# Patient Record
Sex: MALE | Race: WHITE | NOT HISPANIC OR LATINO
[De-identification: names, ages, dates, MRNs, and addresses within clinical notes are randomized per-mention and may not be internally consistent; named-entity substitution may affect disease eponyms.]

---

## 2017-05-11 ENCOUNTER — APPOINTMENT (OUTPATIENT)
Dept: VASCULAR SURGERY | Facility: CLINIC | Age: 52
End: 2017-05-11

## 2017-06-01 ENCOUNTER — APPOINTMENT (OUTPATIENT)
Dept: VASCULAR SURGERY | Facility: CLINIC | Age: 52
End: 2017-06-01

## 2017-06-01 VITALS
HEIGHT: 73 IN | HEART RATE: 70 BPM | DIASTOLIC BLOOD PRESSURE: 87 MMHG | SYSTOLIC BLOOD PRESSURE: 140 MMHG | TEMPERATURE: 98 F

## 2017-06-01 DIAGNOSIS — I83.892 VARICOSE VEINS OF LEFT LOWER EXTREMITY WITH OTHER COMPLICATIONS: ICD-10-CM

## 2018-01-18 ENCOUNTER — TRANSCRIPTION ENCOUNTER (OUTPATIENT)
Age: 53
End: 2018-01-18

## 2018-07-02 ENCOUNTER — NON-APPOINTMENT (OUTPATIENT)
Age: 53
End: 2018-07-02

## 2018-07-02 ENCOUNTER — APPOINTMENT (OUTPATIENT)
Dept: ELECTROPHYSIOLOGY | Facility: CLINIC | Age: 53
End: 2018-07-02
Payer: COMMERCIAL

## 2018-07-02 VITALS
HEIGHT: 73 IN | WEIGHT: 231.92 LBS | DIASTOLIC BLOOD PRESSURE: 76 MMHG | BODY MASS INDEX: 30.74 KG/M2 | HEART RATE: 84 BPM | SYSTOLIC BLOOD PRESSURE: 115 MMHG | OXYGEN SATURATION: 98 %

## 2018-07-02 PROCEDURE — 93000 ELECTROCARDIOGRAM COMPLETE: CPT

## 2018-07-02 PROCEDURE — 99214 OFFICE O/P EST MOD 30 MIN: CPT | Mod: 25

## 2018-07-05 ENCOUNTER — OUTPATIENT (OUTPATIENT)
Dept: OUTPATIENT SERVICES | Facility: HOSPITAL | Age: 53
LOS: 1 days | End: 2018-07-05
Payer: COMMERCIAL

## 2018-07-05 ENCOUNTER — TRANSCRIPTION ENCOUNTER (OUTPATIENT)
Age: 53
End: 2018-07-05

## 2018-07-05 VITALS
OXYGEN SATURATION: 99 % | RESPIRATION RATE: 16 BRPM | TEMPERATURE: 98 F | HEART RATE: 93 BPM | SYSTOLIC BLOOD PRESSURE: 124 MMHG | DIASTOLIC BLOOD PRESSURE: 78 MMHG

## 2018-07-05 VITALS — HEIGHT: 72 IN | WEIGHT: 231.04 LBS

## 2018-07-05 DIAGNOSIS — I48.0 PAROXYSMAL ATRIAL FIBRILLATION: ICD-10-CM

## 2018-07-05 LAB
ANION GAP SERPL CALC-SCNC: 9 MMOL/L — SIGNIFICANT CHANGE UP (ref 5–17)
BUN SERPL-MCNC: 17 MG/DL — SIGNIFICANT CHANGE UP (ref 8–20)
CALCIUM SERPL-MCNC: 9.1 MG/DL — SIGNIFICANT CHANGE UP (ref 8.6–10.2)
CHLORIDE SERPL-SCNC: 101 MMOL/L — SIGNIFICANT CHANGE UP (ref 98–107)
CO2 SERPL-SCNC: 30 MMOL/L — HIGH (ref 22–29)
CREAT SERPL-MCNC: 1.08 MG/DL — SIGNIFICANT CHANGE UP (ref 0.5–1.3)
GLUCOSE SERPL-MCNC: 112 MG/DL — SIGNIFICANT CHANGE UP (ref 70–115)
HCT VFR BLD CALC: 45.5 % — SIGNIFICANT CHANGE UP (ref 42–52)
HGB BLD-MCNC: 15.7 G/DL — SIGNIFICANT CHANGE UP (ref 14–18)
INR BLD: 1.88 RATIO — HIGH (ref 0.88–1.16)
MAGNESIUM SERPL-MCNC: 2 MG/DL — SIGNIFICANT CHANGE UP (ref 1.6–2.6)
MCHC RBC-ENTMCNC: 30.8 PG — SIGNIFICANT CHANGE UP (ref 27–31)
MCHC RBC-ENTMCNC: 34.5 G/DL — SIGNIFICANT CHANGE UP (ref 32–36)
MCV RBC AUTO: 89.4 FL — SIGNIFICANT CHANGE UP (ref 80–94)
PLATELET # BLD AUTO: 193 K/UL — SIGNIFICANT CHANGE UP (ref 150–400)
POTASSIUM SERPL-MCNC: 4.5 MMOL/L — SIGNIFICANT CHANGE UP (ref 3.5–5.3)
POTASSIUM SERPL-SCNC: 4.5 MMOL/L — SIGNIFICANT CHANGE UP (ref 3.5–5.3)
PROTHROM AB SERPL-ACNC: 20.9 SEC — HIGH (ref 9.8–12.7)
RBC # BLD: 5.09 M/UL — SIGNIFICANT CHANGE UP (ref 4.6–6.2)
RBC # FLD: 12.8 % — SIGNIFICANT CHANGE UP (ref 11–15.6)
SODIUM SERPL-SCNC: 140 MMOL/L — SIGNIFICANT CHANGE UP (ref 135–145)
WBC # BLD: 6.8 K/UL — SIGNIFICANT CHANGE UP (ref 4.8–10.8)
WBC # FLD AUTO: 6.8 K/UL — SIGNIFICANT CHANGE UP (ref 4.8–10.8)

## 2018-07-05 PROCEDURE — 93010 ELECTROCARDIOGRAM REPORT: CPT | Mod: 77

## 2018-07-05 PROCEDURE — 93320 DOPPLER ECHO COMPLETE: CPT

## 2018-07-05 PROCEDURE — 36415 COLL VENOUS BLD VENIPUNCTURE: CPT

## 2018-07-05 PROCEDURE — 93320 DOPPLER ECHO COMPLETE: CPT | Mod: 26

## 2018-07-05 PROCEDURE — 85610 PROTHROMBIN TIME: CPT

## 2018-07-05 PROCEDURE — 85027 COMPLETE CBC AUTOMATED: CPT

## 2018-07-05 PROCEDURE — 93325 DOPPLER ECHO COLOR FLOW MAPG: CPT | Mod: 26

## 2018-07-05 PROCEDURE — 93325 DOPPLER ECHO COLOR FLOW MAPG: CPT

## 2018-07-05 PROCEDURE — 76376 3D RENDER W/INTRP POSTPROCES: CPT | Mod: 26

## 2018-07-05 PROCEDURE — 93005 ELECTROCARDIOGRAM TRACING: CPT

## 2018-07-05 PROCEDURE — 93312 ECHO TRANSESOPHAGEAL: CPT | Mod: 26

## 2018-07-05 PROCEDURE — 92960 CARDIOVERSION ELECTRIC EXT: CPT

## 2018-07-05 PROCEDURE — 80048 BASIC METABOLIC PNL TOTAL CA: CPT

## 2018-07-05 PROCEDURE — 93312 ECHO TRANSESOPHAGEAL: CPT

## 2018-07-05 PROCEDURE — 83735 ASSAY OF MAGNESIUM: CPT

## 2018-07-05 RX ORDER — RIVAROXABAN 15 MG-20MG
0 KIT ORAL
Qty: 0 | Refills: 0 | COMMUNITY

## 2018-07-05 NOTE — DISCHARGE NOTE ADULT - PROVIDER TOKENS
TOKSUMMER:'[id number]' FREE:[LAST:[Anne],FIRST:[Boone],PHONE:[phone]],FAX:[(   )    -],ADDRESS:[[facility]  [address] [place], [place] [id number]]]

## 2018-07-05 NOTE — DISCHARGE NOTE ADULT - CARE PROVIDER_API CALL
Boone Rodríguez (MD), Cardiac Electrophysiology; Cardiology; Internal Medicine  [address] [place] [id number]  Phone: [phone]  Fax: [phone] Boone Rodríguez  [facility]  [address] [place] [address]  Phone: [phone]  Fax: (   )    -

## 2018-07-05 NOTE — DISCHARGE NOTE ADULT - HOSPITAL COURSE
53 year old male patient with a history of asthma and atrial fibrillation diagnosed in 2011 now with recurrent, symptomatic persistent AF on Xarelto since 7/3/18. He presented electively 7/5/18 and underwent 53 year old male patient with a history of asthma and atrial fibrillation diagnosed in 2011 now with recurrent, symptomatic persistent AF on Xarelto since 7/3/18. He presented electively 7/5/18 and underwent uncomplicated OWEN, which demonstrated no intracardiac thrombus, and successful cardioversion with restoration of sinus rhythm. He was observed per post procedure protocol and discharged home with a plan for outpatient follow up.

## 2018-07-05 NOTE — DISCHARGE NOTE ADULT - PLAN OF CARE
minimize arrhythmia burden -Take each dose of your anticoagulation medication (blood thinner) exactly as prescribed.   - Do not drive, operate heavy machinery or make important decisions for 24 hours following the procedure.  - You may resume all other activities the day after the procedure.  Call your doctor if:   - your rapid heart rhythm returns.  - you have any questions or concerns regarding the procedure.  If you experience increased difficulty breathing or chest pain, or if you faint or have dizzy spells, please seek immediate medical attention.

## 2018-07-05 NOTE — DISCHARGE NOTE ADULT - MEDICATION SUMMARY - MEDICATIONS TO TAKE
I will START or STAY ON the medications listed below when I get home from the hospital:    Xarelto  -- Indication: For stroke prevention    metoprolol succinate 50 mg oral tablet, extended release  -- 1 tab(s) by mouth once a day  -- Indication: For heart rate and rhythm management

## 2018-07-05 NOTE — DISCHARGE NOTE ADULT - CARE PLAN
Principal Discharge DX:	Persistent atrial fibrillation Principal Discharge DX:	Persistent atrial fibrillation  Goal:	minimize arrhythmia burden  Assessment and plan of treatment:	-Take each dose of your anticoagulation medication (blood thinner) exactly as prescribed.   - Do not drive, operate heavy machinery or make important decisions for 24 hours following the procedure.  - You may resume all other activities the day after the procedure.  Call your doctor if:   - your rapid heart rhythm returns.  - you have any questions or concerns regarding the procedure.  If you experience increased difficulty breathing or chest pain, or if you faint or have dizzy spells, please seek immediate medical attention.

## 2018-07-05 NOTE — DISCHARGE NOTE ADULT - PATIENT PORTAL LINK FT
You can access the [facility], offered by [facility], by registering with the following website: [email or web address]

## 2018-07-05 NOTE — PROGRESS NOTE ADULT - SUBJECTIVE AND OBJECTIVE BOX
Pt doing well s/p uncomplicated OWEN & DCCV. Denies complaint.     Exam:   VSS, NAD, A&O x 3  Skin: no erythema/edema/blistering at defib pad sites.   Card: S1/S2, RRR, no m/g/r  Resp: lungs CTA b/l  Abd: S/NT/ND  Ext: no edema, distal pulses intact    Assessment:   53 year old male patient with a history of asthma and atrial fibrillation diagnosed in 2011 now with recurrent, symptomatic persistent AF. Now status post OWEN negative for TIN thrombus and uncomplicated DCCV with restoration of sinus rhythm.     Plan:   Observation on telemetry per post op protocol.    Resume PO intake.   Ambulate w/ assist once fully awake & back to baseline mental status w/ VSS.  Continue Xarelto. Importance of strict compliance with anticoagulation regimen reinforced with pt.   Resume other home medications.   Anticipate d/c home once all criteria met, with outpt f/up in 1 month.

## 2018-07-05 NOTE — H&P PST ADULT - ASSESSMENT
53 year old male patient with a history of asthma and atrial fibrillation diagnosed in 2011 with recurrence of AFib. Started Xarelto two days ago. Plan for OWEN guided cardioversion    - Keep NPO post midnight day of the procedure.

## 2018-07-05 NOTE — CHART NOTE - NSCHARTNOTEFT_GEN_A_CORE
Procedure Note    OWEN/CV    Successful    Continue on Metoprolol and Xarelto   Followup in 2 weeks.

## 2018-07-25 ENCOUNTER — APPOINTMENT (OUTPATIENT)
Dept: ELECTROPHYSIOLOGY | Facility: CLINIC | Age: 53
End: 2018-07-25
Payer: COMMERCIAL

## 2018-07-25 ENCOUNTER — NON-APPOINTMENT (OUTPATIENT)
Age: 53
End: 2018-07-25

## 2018-07-25 VITALS
HEART RATE: 66 BPM | BODY MASS INDEX: 30.62 KG/M2 | SYSTOLIC BLOOD PRESSURE: 128 MMHG | DIASTOLIC BLOOD PRESSURE: 83 MMHG | OXYGEN SATURATION: 98 % | WEIGHT: 231 LBS | HEIGHT: 73 IN

## 2018-07-25 PROCEDURE — 93000 ELECTROCARDIOGRAM COMPLETE: CPT

## 2018-07-25 PROCEDURE — 99214 OFFICE O/P EST MOD 30 MIN: CPT

## 2018-07-25 RX ORDER — RIVAROXABAN 20 MG/1
20 TABLET, FILM COATED ORAL
Qty: 30 | Refills: 3 | Status: DISCONTINUED | COMMUNITY
Start: 2018-07-02 | End: 2018-07-25

## 2020-01-10 ENCOUNTER — EMERGENCY (EMERGENCY)
Facility: HOSPITAL | Age: 55
LOS: 1 days | Discharge: HOME | End: 2020-01-10
Attending: EMERGENCY MEDICINE
Payer: COMMERCIAL

## 2020-01-10 VITALS
SYSTOLIC BLOOD PRESSURE: 102 MMHG | RESPIRATION RATE: 15 BRPM | TEMPERATURE: 98 F | HEART RATE: 78 BPM | DIASTOLIC BLOOD PRESSURE: 70 MMHG | OXYGEN SATURATION: 98 %

## 2020-01-10 VITALS
DIASTOLIC BLOOD PRESSURE: 75 MMHG | TEMPERATURE: 98 F | RESPIRATION RATE: 17 BRPM | SYSTOLIC BLOOD PRESSURE: 123 MMHG | HEART RATE: 79 BPM | HEIGHT: 73 IN | WEIGHT: 235.01 LBS | OXYGEN SATURATION: 95 %

## 2020-01-10 LAB
ALBUMIN SERPL ELPH-MCNC: 4.4 G/DL — SIGNIFICANT CHANGE UP (ref 3.3–5)
ALP SERPL-CCNC: 64 U/L — SIGNIFICANT CHANGE UP (ref 40–120)
ALT FLD-CCNC: 37 U/L — SIGNIFICANT CHANGE UP (ref 10–45)
ANION GAP SERPL CALC-SCNC: 12 MMOL/L — SIGNIFICANT CHANGE UP (ref 5–17)
AST SERPL-CCNC: 21 U/L — SIGNIFICANT CHANGE UP (ref 10–40)
BASOPHILS # BLD AUTO: 0.08 K/UL — SIGNIFICANT CHANGE UP (ref 0–0.2)
BASOPHILS NFR BLD AUTO: 0.9 % — SIGNIFICANT CHANGE UP (ref 0–2)
BILIRUB SERPL-MCNC: 0.4 MG/DL — SIGNIFICANT CHANGE UP (ref 0.2–1.2)
BUN SERPL-MCNC: 20 MG/DL — SIGNIFICANT CHANGE UP (ref 7–23)
CALCIUM SERPL-MCNC: 9.4 MG/DL — SIGNIFICANT CHANGE UP (ref 8.4–10.5)
CHLORIDE SERPL-SCNC: 101 MMOL/L — SIGNIFICANT CHANGE UP (ref 96–108)
CO2 SERPL-SCNC: 25 MMOL/L — SIGNIFICANT CHANGE UP (ref 22–31)
CREAT SERPL-MCNC: 1.38 MG/DL — HIGH (ref 0.5–1.3)
EOSINOPHIL # BLD AUTO: 0.28 K/UL — SIGNIFICANT CHANGE UP (ref 0–0.5)
EOSINOPHIL NFR BLD AUTO: 3.1 % — SIGNIFICANT CHANGE UP (ref 0–6)
GLUCOSE SERPL-MCNC: 102 MG/DL — HIGH (ref 70–99)
HCT VFR BLD CALC: 49.9 % — SIGNIFICANT CHANGE UP (ref 39–50)
HGB BLD-MCNC: 17.4 G/DL — HIGH (ref 13–17)
IMM GRANULOCYTES NFR BLD AUTO: 0.3 % — SIGNIFICANT CHANGE UP (ref 0–1.5)
LYMPHOCYTES # BLD AUTO: 2.66 K/UL — SIGNIFICANT CHANGE UP (ref 1–3.3)
LYMPHOCYTES # BLD AUTO: 29.8 % — SIGNIFICANT CHANGE UP (ref 13–44)
MCHC RBC-ENTMCNC: 30.7 PG — SIGNIFICANT CHANGE UP (ref 27–34)
MCHC RBC-ENTMCNC: 34.9 GM/DL — SIGNIFICANT CHANGE UP (ref 32–36)
MCV RBC AUTO: 88.2 FL — SIGNIFICANT CHANGE UP (ref 80–100)
MONOCYTES # BLD AUTO: 1.02 K/UL — HIGH (ref 0–0.9)
MONOCYTES NFR BLD AUTO: 11.4 % — SIGNIFICANT CHANGE UP (ref 2–14)
NEUTROPHILS # BLD AUTO: 4.87 K/UL — SIGNIFICANT CHANGE UP (ref 1.8–7.4)
NEUTROPHILS NFR BLD AUTO: 54.5 % — SIGNIFICANT CHANGE UP (ref 43–77)
NRBC # BLD: 0 /100 WBCS — SIGNIFICANT CHANGE UP (ref 0–0)
PLATELET # BLD AUTO: 238 K/UL — SIGNIFICANT CHANGE UP (ref 150–400)
POTASSIUM SERPL-MCNC: 3.9 MMOL/L — SIGNIFICANT CHANGE UP (ref 3.5–5.3)
POTASSIUM SERPL-SCNC: 3.9 MMOL/L — SIGNIFICANT CHANGE UP (ref 3.5–5.3)
PROT SERPL-MCNC: 7.2 G/DL — SIGNIFICANT CHANGE UP (ref 6–8.3)
RBC # BLD: 5.66 M/UL — SIGNIFICANT CHANGE UP (ref 4.2–5.8)
RBC # FLD: 12.7 % — SIGNIFICANT CHANGE UP (ref 10.3–14.5)
SODIUM SERPL-SCNC: 138 MMOL/L — SIGNIFICANT CHANGE UP (ref 135–145)
WBC # BLD: 8.94 K/UL — SIGNIFICANT CHANGE UP (ref 3.8–10.5)
WBC # FLD AUTO: 8.94 K/UL — SIGNIFICANT CHANGE UP (ref 3.8–10.5)

## 2020-01-10 PROCEDURE — 93010 ELECTROCARDIOGRAM REPORT: CPT | Mod: 59

## 2020-01-10 PROCEDURE — 99152 MOD SED SAME PHYS/QHP 5/>YRS: CPT

## 2020-01-10 PROCEDURE — 99284 EMERGENCY DEPT VISIT MOD MDM: CPT | Mod: 25

## 2020-01-10 PROCEDURE — 92960 CARDIOVERSION ELECTRIC EXT: CPT

## 2020-01-10 RX ORDER — MIDAZOLAM HYDROCHLORIDE 1 MG/ML
4 INJECTION, SOLUTION INTRAMUSCULAR; INTRAVENOUS ONCE
Refills: 0 | Status: DISCONTINUED | OUTPATIENT
Start: 2020-01-10 | End: 2020-01-10

## 2020-01-10 RX ADMIN — MIDAZOLAM HYDROCHLORIDE 4 MILLIGRAM(S): 1 INJECTION, SOLUTION INTRAMUSCULAR; INTRAVENOUS at 22:07

## 2020-01-10 NOTE — ED PROVIDER NOTE - CLINICAL SUMMARY MEDICAL DECISION MAKING FREE TEXT BOX
54M h/o afib with cardioversions x 2 p/w sob, afib with RVR that started this AM. well appearing male, afib with rvr. onset < 48hrs, DEE VASC 0, no need for anticoagulation. will cardiovert 54M h/o afib with cardioversions x 2 p/w sob, afib with RVR that started this AM. well appearing male, afib with rvr. onset < 48hrs, DEE VASC 0, no need for anticoagulation. will cardiovert  Scotty: pt with onset of sx today. hx of paroxysmal afib. Has been shocked out of afib before. No chest pain, no syncope, no trauma. Patient requests cardioversion. Will cardiovert.

## 2020-01-10 NOTE — ED ADULT NURSE REASSESSMENT NOTE - NS ED NURSE REASSESS COMMENT FT1
A fib HR 100s on CM. Pt sedated with 4mg versed per MD Fajardo order for conscious sedation and cardioversion. 200J cardioversion at 2207, capnography, o2 sat, BP and cardiac monitoring. Ambu bag, crash cart at bedside. MD Allen and MD Fajardo at bedside. Conscious sedation paper work placed in chart. Pt consent obtained prior to procedure and placed in pt chart. Pt is back to baseline, alert, oriented & talking, sating 100% RA, NSR HR 80s and normotensive. Awaiting dispo at this time.

## 2020-01-10 NOTE — ED ADULT NURSE NOTE - OBJECTIVE STATEMENT
Pt here for [facility]. Arrives Ambulating independently     Modifications in dose or schedule: No    Patient reports she is feeling well denies any complaints.       Port previously accessed from lab draw, port flushed with saline, positive blood return 53 y/o male PMH episodes of A fib presents to ED reporting abnormal EKG at urgent care. Pt reports having EKG done after feeling like he was in A fib. Pt reports having this happen 3 times in the past and report heart was "restarted with electricity". Pt reports only change since today is pt had given up coffee for the new year but had a cup of coffee today. On exam, AOx3, speaking in complete sentences. Lung sounds CTA, NAD, O2 sat 98% RA. Abdomen soft, non-tender, non-distended. Pt denies CP, SOB, n/v/d, fever/chills. EKG completed and given to attending MD. CM in place A fib HR 100s. Heplock placed.

## 2020-01-10 NOTE — ED PROVIDER NOTE - ATTENDING CONTRIBUTION TO CARE
I performed a history and physical exam of the patient and discussed their management with the resident and /or advanced care provider. I reviewed the resident and /or ACP's note and agree with the documented findings and plan of care. My medical decision making and observations are found above.  Lungs clear, abd soft

## 2020-01-10 NOTE — ED PROVIDER NOTE - PHYSICAL EXAMINATION
Vitals: tachycardic, remainder wnl  Gen: laying comfortably in NAD  Head: NCAT  ENT: sclerae white, anicterus, moist mucous membranes. No exudates.   CV: tachycardic, irregular rhythm  Pulm: Clear to auscultation bilaterally. No wheezes, rales, or rhonchi  Abd: soft, normoactive BS x4, NTND, no rebound, no guarding, no rashes  Musculoskeletal:  No peripheral edema  Skin: no lesions or scars noted  Neurologic: AAOx3  : no CVA tenderness  Psych: normal affect

## 2020-01-10 NOTE — ED PROVIDER NOTE - PATIENT PORTAL LINK FT
You can access the FollowMyHealth Patient Portal offered by [facility] by registering at the following website: [email or web address]. By joining [employer]’s FollowMyHealth portal, you will also be able to view your health information using other applications (apps) compatible with our system.

## 2020-01-10 NOTE — ED PROVIDER NOTE - NS ED ROS FT
Gen: No fever, normal appetite  Eyes: No eye irritation or discharge  ENT: No earpain, congestion, sore throat  Resp: +sob  Cardiovascular: +palpitations  Gastroenteric: No nausea/vomiting, diarrhea, constipation  : No dysuria  MS: No joint or muscle pain  Skin: No rashes  Neuro: No headache  Remainder negative, except as per the HPI

## 2020-01-10 NOTE — ED PROCEDURE NOTE - ATTENDING CONTRIBUTION TO CARE
I Yonathan Fajardo MD discussed the procedure with the resident and or ACP and went over risks and benefits as well as indications for the procedure. I was present for key portions of the procedure itself and assisted as needed.

## 2020-01-10 NOTE — ED CLERICAL - NS ED CLERK NOTE PRE-ARRIVAL INFORMATION; ADDITIONAL PRE-ARRIVAL INFORMATION
CC/Reason For referral: Hx of AFib, Rapid AFib  Preferred Consultant(if applicable): N/A  Who admits for you (if needed): N/A  Do you have documents you would like to fax over? No  Would you still like to speak to an ED attending? Yes, call Dr Peres [phone] after patient is seen

## 2020-01-10 NOTE — ED PROVIDER NOTE - PROGRESS NOTE DETAILS
Princess Allen MD: cardioversion and conscious sedation performed. pt converted to sinus. pt now back to baxeline mental status after versed. dc home with pmd f/u

## 2020-01-10 NOTE — ED PROVIDER NOTE - NSFOLLOWUPINSTRUCTIONS_ED_ALL_ED_FT
1) Please follow-up with your cardiologist within the next 3 days.  Please call today or tomorrow for an appointment.  If you cannot follow-up with your doctor(s), please return to the ED for any urgent issues.  2) If you have any worsening of symptoms or any other concerns please return to the ED immediately.  3) Please continue taking your home medications as directed.  4) You may have been given a copy of your labs and/or imaging.  Please go over these with your primary care doctor.

## 2020-01-10 NOTE — ED PROVIDER NOTE - OBJECTIVE STATEMENT
54M h/o afib with cardioversions x 2 p/w sob, afib with RVR that started this AM. pt reports he had had afib twice prior in the last 10y, was cardioverted each time and sent hoem. Is not on any meds for afib or anticoagulation. does follow with a cardiologist for this. Was feeling well this morning when he was sitting at his desk, suddnely felt sob, dizzy, palpitations. requesting cardioversion at this time. denies f/c, URI symptoms, n/v, cp/sob, abd pain, diarrhea/constiaption.

## 2020-01-11 LAB
T4 FREE SERPL-MCNC: 1.4 NG/DL — SIGNIFICANT CHANGE UP (ref 0.9–1.8)
TSH SERPL-MCNC: 5.03 UIU/ML — HIGH (ref 0.27–4.2)

## 2020-01-12 ENCOUNTER — TRANSCRIPTION ENCOUNTER (OUTPATIENT)
Age: 55
End: 2020-01-12

## 2020-01-13 ENCOUNTER — NON-APPOINTMENT (OUTPATIENT)
Age: 55
End: 2020-01-13

## 2020-01-13 ENCOUNTER — APPOINTMENT (OUTPATIENT)
Dept: ELECTROPHYSIOLOGY | Facility: CLINIC | Age: 55
End: 2020-01-13
Payer: COMMERCIAL

## 2020-01-13 VITALS
DIASTOLIC BLOOD PRESSURE: 80 MMHG | HEART RATE: 61 BPM | HEIGHT: 73 IN | WEIGHT: 230 LBS | SYSTOLIC BLOOD PRESSURE: 110 MMHG | OXYGEN SATURATION: 98 % | BODY MASS INDEX: 30.48 KG/M2

## 2020-01-13 PROCEDURE — 99213 OFFICE O/P EST LOW 20 MIN: CPT

## 2020-01-13 PROCEDURE — 93000 ELECTROCARDIOGRAM COMPLETE: CPT

## 2020-01-29 NOTE — HISTORY OF PRESENT ILLNESS
[FreeTextEntry1] : Patient is a 54-year-old man with a prior history of paroxysmal atrial fibrillation.  He within the last week underwent repeat cardioversion for recurrent episodes of A. fib.  Prior to that he had electrical cardioversion approximately a year ago.  He has had no clear triggers to his A. fib.  He seems to be getting episodes approximately once yearly and question whether the frequency is increasing at this time.  The patient is aware of the A. fib when it occurs.  He feels symptoms of palpitations as well as fatigue.  He is currently feeling well and has no symptoms.  \par \par No h/o HTN, DM, Lung disease or thyroid disease\par Never tested for sleep apnea\par No GERD. Occasional alcohol\par Denies nicotine or excessive caffein

## 2020-01-29 NOTE — DISCUSSION/SUMMARY
[FreeTextEntry1] : He has a infrequent episodes of A. fib with no clear trigger.  He has Low CHADSVASc - no HTN or DM - \par Patient does not want antiarrhythmic.  We discussed the option of catheter ablation especially if these episodes were to become more frequent.  He will consider this option and follow-up with us.  In the meantime I have discussed discussed lifestyle modifications that may help to prevent his A. fib.  This includes exercise, weight loss and assessment for sleep apnea.  He will follow-up with his regular internist and obtain a cardiologist as well.  The patient will see me in follow-up if he has recurrent A. fib.  He is currently on aspirin 81 mg/day which would continue for now.\par

## 2020-01-29 NOTE — PHYSICAL EXAM
[General Appearance - Well Developed] : well developed [General Appearance - In No Acute Distress] : no acute distress [Normal Conjunctiva] : the conjunctiva exhibited no abnormalities [No Oral Pallor] : no oral pallor [Normal Jugular Venous V Waves Present] : normal jugular venous V waves present [Respiration, Rhythm And Depth] : normal respiratory rhythm and effort [Auscultation Breath Sounds / Voice Sounds] : lungs were clear to auscultation bilaterally [Abdomen Soft] : soft [Abdomen Tenderness] : non-tender [Abnormal Walk] : normal gait [Nail Clubbing] : no clubbing of the fingernails [Impaired Insight] : insight and judgment were intact [Cyanosis, Localized] : no localized cyanosis [5th Left ICS - MCL] : palpated at the 5th LICS in the midclavicular line [Normal Rate] : normal [Rhythm Regular] : regular [Normal S1] : normal S1 [Normal S2] : normal S2 [2+] : left 2+ [No Pitting Edema] : no pitting edema present

## 2020-02-03 ENCOUNTER — APPOINTMENT (OUTPATIENT)
Dept: INTERNAL MEDICINE | Facility: CLINIC | Age: 55
End: 2020-02-03
Payer: COMMERCIAL

## 2020-02-03 VITALS
RESPIRATION RATE: 16 BRPM | OXYGEN SATURATION: 98 % | SYSTOLIC BLOOD PRESSURE: 120 MMHG | TEMPERATURE: 98.2 F | HEART RATE: 73 BPM | WEIGHT: 231 LBS | HEIGHT: 73 IN | BODY MASS INDEX: 30.62 KG/M2 | DIASTOLIC BLOOD PRESSURE: 78 MMHG

## 2020-02-03 PROCEDURE — 99204 OFFICE O/P NEW MOD 45 MIN: CPT | Mod: 25

## 2020-02-03 PROCEDURE — ZZZZZ: CPT

## 2020-02-03 PROCEDURE — 94729 DIFFUSING CAPACITY: CPT

## 2020-02-03 PROCEDURE — 94010 BREATHING CAPACITY TEST: CPT

## 2020-02-03 PROCEDURE — 94727 GAS DIL/WSHOT DETER LNG VOL: CPT

## 2020-02-03 NOTE — PROCEDURE
[FreeTextEntry1] : Full pulmonary function tests and is within normal limits with an FEV1 of 3.73 or 89% predicted.  diffusing capacity is normal.

## 2020-02-03 NOTE — ASSESSMENT
[FreeTextEntry1] : #1 evaluate for obstructive sleep apnea. The patient will have an overnight home sleep study.  He will follow up on the phone after results are in.  For now, INGA will continue strict weight reduction efforts, sleep on the sides, and avoid alcohol and sedating medicines. The patient knows fully not to drive or work with machinery if having any sleepiness or tiredness. \par \par #2 h.o. PAF. Continue following closely with cardiologist Dr. Crane. \par \par #3 hypothyroidism.

## 2020-02-03 NOTE — REVIEW OF SYSTEMS
[Cough] : no cough [Sputum] : no sputum [Hemoptysis] : no hemoptysis [Wheezing] : no wheezing [Dyspnea] : no dyspnea [Chest Discomfort] : no chest discomfort [Syncope] : no syncope [Dizziness] : no dizziness [Negative] : Endocrine

## 2020-02-03 NOTE — PHYSICAL EXAM
[No Acute Distress] : no acute distress [Well Groomed] : well groomed [Normal Oropharynx] : normal oropharynx [Normal Appearance] : normal appearance [No Neck Mass] : no neck mass [Normal Rate/Rhythm] : normal rate/rhythm [Normal S1, S2] : normal s1, s2 [No Murmurs] : no murmurs [Clear to Auscultation Bilaterally] : clear to auscultation bilaterally [No Resp Distress] : no resp distress [Benign] : benign [No Abnormalities] : no abnormalities [Normal Gait] : normal gait [No Cyanosis] : no cyanosis [No Clubbing] : no clubbing [No Edema] : no edema [No Focal Deficits] : no focal deficits [Normal Color/ Pigmentation] : normal color/ pigmentation [Normal Affect] : normal affect

## 2020-02-03 NOTE — HISTORY OF PRESENT ILLNESS
[TextBox_4] : This initial pulmonary appointment for this pleasant 54-year-old male with a history of paroxysmal atrial fibrillation, hypothyroidism. He was referred by his cardiologist Dr. GUY Crane in order to have an overnight sleep study. Patient does have a history of significant snoring and occasional daytime somnolence. He does not take naps. There have not been witnessed apneas. He does not awaken with morning headache. He generally does awaken feeling not refreshed.\par \par He has a history of asthma, but recently has not been active. He denies recent cough, wheezing, dyspnea on exertion, or sputum production.\par \par He does not use home O2 and CPAP.\par \par He is a nonsmoker. He has an occasional alcohol drink.

## 2020-02-05 PROBLEM — Z00.00 ENCOUNTER FOR PREVENTIVE HEALTH EXAMINATION: Status: ACTIVE | Noted: 2020-02-05

## 2020-02-24 ENCOUNTER — APPOINTMENT (OUTPATIENT)
Dept: SLEEP CENTER | Facility: CLINIC | Age: 55
End: 2020-02-24

## 2023-05-26 ENCOUNTER — NON-APPOINTMENT (OUTPATIENT)
Age: 58
End: 2023-05-26

## 2023-05-26 RX ORDER — ASPIRIN 81 MG
81 TABLET, DELAYED RELEASE (ENTERIC COATED) ORAL
Refills: 0 | Status: DISCONTINUED | COMMUNITY
End: 2023-05-26

## 2023-05-28 ENCOUNTER — EMERGENCY (EMERGENCY)
Facility: HOSPITAL | Age: 58
LOS: 1 days | Discharge: HOME | End: 2023-05-28
Attending: STUDENT IN AN ORGANIZED HEALTH CARE EDUCATION/TRAINING PROGRAM
Payer: COMMERCIAL

## 2023-05-28 VITALS
DIASTOLIC BLOOD PRESSURE: 80 MMHG | WEIGHT: 214.07 LBS | RESPIRATION RATE: 17 BRPM | OXYGEN SATURATION: 98 % | TEMPERATURE: 98 F | HEART RATE: 92 BPM | HEIGHT: 73 IN | SYSTOLIC BLOOD PRESSURE: 169 MMHG

## 2023-05-28 VITALS
SYSTOLIC BLOOD PRESSURE: 118 MMHG | DIASTOLIC BLOOD PRESSURE: 81 MMHG | RESPIRATION RATE: 18 BRPM | TEMPERATURE: 97 F | HEART RATE: 87 BPM | OXYGEN SATURATION: 98 %

## 2023-05-28 LAB
ALBUMIN SERPL ELPH-MCNC: 3.9 G/DL — SIGNIFICANT CHANGE UP (ref 3.3–5)
ALP SERPL-CCNC: 56 U/L — SIGNIFICANT CHANGE UP (ref 40–120)
ALT FLD-CCNC: 31 U/L — SIGNIFICANT CHANGE UP (ref 10–45)
ANION GAP SERPL CALC-SCNC: 14 MMOL/L — SIGNIFICANT CHANGE UP (ref 5–17)
APTT BLD: 37.1 SEC — HIGH (ref 27.5–35.5)
AST SERPL-CCNC: 21 U/L — SIGNIFICANT CHANGE UP (ref 10–40)
BASOPHILS # BLD AUTO: 0.04 K/UL — SIGNIFICANT CHANGE UP (ref 0–0.2)
BASOPHILS NFR BLD AUTO: 0.6 % — SIGNIFICANT CHANGE UP (ref 0–2)
BILIRUB SERPL-MCNC: 0.5 MG/DL — SIGNIFICANT CHANGE UP (ref 0.2–1.2)
BUN SERPL-MCNC: 22 MG/DL — SIGNIFICANT CHANGE UP (ref 7–23)
CALCIUM SERPL-MCNC: 8.8 MG/DL — SIGNIFICANT CHANGE UP (ref 8.4–10.5)
CHLORIDE SERPL-SCNC: 104 MMOL/L — SIGNIFICANT CHANGE UP (ref 96–108)
CO2 SERPL-SCNC: 26 MMOL/L — SIGNIFICANT CHANGE UP (ref 22–31)
CREAT SERPL-MCNC: 1.14 MG/DL — SIGNIFICANT CHANGE UP (ref 0.5–1.3)
EGFR: 74 ML/MIN/1.73M2 — SIGNIFICANT CHANGE UP
EOSINOPHIL # BLD AUTO: 0.16 K/UL — SIGNIFICANT CHANGE UP (ref 0–0.5)
EOSINOPHIL NFR BLD AUTO: 2.5 % — SIGNIFICANT CHANGE UP (ref 0–6)
GLUCOSE SERPL-MCNC: 95 MG/DL — SIGNIFICANT CHANGE UP (ref 70–99)
HCT VFR BLD CALC: 48.4 % — SIGNIFICANT CHANGE UP (ref 39–50)
HGB BLD-MCNC: 16.3 G/DL — SIGNIFICANT CHANGE UP (ref 13–17)
IMM GRANULOCYTES NFR BLD AUTO: 0.3 % — SIGNIFICANT CHANGE UP (ref 0–0.9)
INR BLD: 1.87 RATIO — HIGH (ref 0.88–1.16)
LYMPHOCYTES # BLD AUTO: 1.25 K/UL — SIGNIFICANT CHANGE UP (ref 1–3.3)
LYMPHOCYTES # BLD AUTO: 19.3 % — SIGNIFICANT CHANGE UP (ref 13–44)
MAGNESIUM SERPL-MCNC: 1.9 MG/DL — SIGNIFICANT CHANGE UP (ref 1.6–2.6)
MCHC RBC-ENTMCNC: 30.2 PG — SIGNIFICANT CHANGE UP (ref 27–34)
MCHC RBC-ENTMCNC: 33.7 GM/DL — SIGNIFICANT CHANGE UP (ref 32–36)
MCV RBC AUTO: 89.6 FL — SIGNIFICANT CHANGE UP (ref 80–100)
MONOCYTES # BLD AUTO: 0.62 K/UL — SIGNIFICANT CHANGE UP (ref 0–0.9)
MONOCYTES NFR BLD AUTO: 9.6 % — SIGNIFICANT CHANGE UP (ref 2–14)
NEUTROPHILS # BLD AUTO: 4.38 K/UL — SIGNIFICANT CHANGE UP (ref 1.8–7.4)
NEUTROPHILS NFR BLD AUTO: 67.7 % — SIGNIFICANT CHANGE UP (ref 43–77)
NRBC # BLD: 0 /100 WBCS — SIGNIFICANT CHANGE UP (ref 0–0)
PLATELET # BLD AUTO: 205 K/UL — SIGNIFICANT CHANGE UP (ref 150–400)
POTASSIUM SERPL-MCNC: 4.1 MMOL/L — SIGNIFICANT CHANGE UP (ref 3.5–5.3)
POTASSIUM SERPL-SCNC: 4.1 MMOL/L — SIGNIFICANT CHANGE UP (ref 3.5–5.3)
PROT SERPL-MCNC: 6.2 G/DL — SIGNIFICANT CHANGE UP (ref 6–8.3)
PROTHROM AB SERPL-ACNC: 21.6 SEC — HIGH (ref 10.5–13.4)
RBC # BLD: 5.4 M/UL — SIGNIFICANT CHANGE UP (ref 4.2–5.8)
RBC # FLD: 12.1 % — SIGNIFICANT CHANGE UP (ref 10.3–14.5)
SODIUM SERPL-SCNC: 144 MMOL/L — SIGNIFICANT CHANGE UP (ref 135–145)
TROPONIN T, HIGH SENSITIVITY RESULT: 10 NG/L — SIGNIFICANT CHANGE UP (ref 0–51)
WBC # BLD: 6.47 K/UL — SIGNIFICANT CHANGE UP (ref 3.8–10.5)
WBC # FLD AUTO: 6.47 K/UL — SIGNIFICANT CHANGE UP (ref 3.8–10.5)

## 2023-05-28 PROCEDURE — 86850 RBC ANTIBODY SCREEN: CPT

## 2023-05-28 PROCEDURE — 99204 OFFICE O/P NEW MOD 45 MIN: CPT

## 2023-05-28 PROCEDURE — 85610 PROTHROMBIN TIME: CPT

## 2023-05-28 PROCEDURE — 80053 COMPREHEN METABOLIC PANEL: CPT

## 2023-05-28 PROCEDURE — 86901 BLOOD TYPING SEROLOGIC RH(D): CPT

## 2023-05-28 PROCEDURE — 86900 BLOOD TYPING SEROLOGIC ABO: CPT

## 2023-05-28 PROCEDURE — 85730 THROMBOPLASTIN TIME PARTIAL: CPT

## 2023-05-28 PROCEDURE — 93005 ELECTROCARDIOGRAM TRACING: CPT

## 2023-05-28 PROCEDURE — 83735 ASSAY OF MAGNESIUM: CPT

## 2023-05-28 PROCEDURE — 99284 EMERGENCY DEPT VISIT MOD MDM: CPT | Mod: 25

## 2023-05-28 PROCEDURE — 84484 ASSAY OF TROPONIN QUANT: CPT

## 2023-05-28 PROCEDURE — 85025 COMPLETE CBC W/AUTO DIFF WBC: CPT

## 2023-05-28 PROCEDURE — 99285 EMERGENCY DEPT VISIT HI MDM: CPT

## 2023-05-28 RX ORDER — METOPROLOL TARTRATE 50 MG
25 TABLET ORAL ONCE
Refills: 0 | Status: COMPLETED | OUTPATIENT
Start: 2023-05-28 | End: 2023-05-28

## 2023-05-28 RX ORDER — ROSUVASTATIN CALCIUM 5 MG/1
1 TABLET ORAL
Qty: 14 | Refills: 0
Start: 2023-05-28 | End: 2023-06-10

## 2023-05-28 RX ORDER — METOPROLOL TARTRATE 50 MG
1 TABLET ORAL
Qty: 14 | Refills: 0
Start: 2023-05-28 | End: 2023-06-10

## 2023-05-28 RX ADMIN — Medication 25 MILLIGRAM(S): at 11:38

## 2023-05-28 NOTE — ED ADULT NURSE NOTE - OBJECTIVE STATEMENT
58Y male, A&)x4, pmh of afib. pt presents to the ED c/o afib. states x1 week ago on sunday he ate cookies without knowing there was thc in them, woke up Monday afternoon with palpitations. pt went to his cardiologist and was told he is in afib. pt has had episodes of afib in the past requiring electric cardioversion. endorses intermittent palpitations, light headiness, and CASTRO. denies f/n/v/d, headache, vision changes, back pain, abd pain, numbness/tingling, recent trauma.

## 2023-05-28 NOTE — ED PROVIDER NOTE - PHYSICAL EXAMINATION
GENERAL: Not in acute distress, non-toxic appearing  HEAD: normocephalic, atraumatic  HEENT: EOMI, normal conjunctiva, oral mucosa moist, neck supple  CARDIAC: irregular rhythm with rate in the 90s  PULM: clear to ascultation bilaterally, no crackles, rales, rhonchi, or wheezing  GI: abdomen nondistended, soft, nontender, no guarding or rebound tenderness  NEURO: alert and oriented x 3, normal speech, no focal motor or sensory deficits, gait normal, no gross neurologic deficit  MSK: No visible deformities, no peripheral edema, calf tenderness/redness/swelling  SKIN: No visible rashes, dry, well-perfused  PSYCH: appropriate mood and affect

## 2023-05-28 NOTE — ED PROVIDER NOTE - PATIENT PORTAL LINK FT
You can access the FollowMyHealth Patient Portal offered by [facility] by registering at the following website: [email or web address]. By joining [employer]’s FollowMyHealth portal, you will also be able to view your health information using other applications (apps) compatible with our system.

## 2023-05-28 NOTE — ED ADULT NURSE NOTE - NS PRO AD NO ADVANCE DIRECTIVE
Called patient to follow-up on missed group session and inform of practice assignment for final session. Patient was not reached. Inbasket message will be sent and I will make another follow-up call.  
No

## 2023-05-28 NOTE — ED PROVIDER NOTE - OBJECTIVE STATEMENT
58 year old male with hx of episodes of AFib in the past sent into the ED by cardiologist Dr. Boone Rodríguez for cardioversion and assessment for ablation. He reports 1 week ago he accidentally ate cookies that had ~200mg worth of THC. He went to sleep and woke up about 18hrs later and felt he was in Afib. He has had palpitations as well as exertional SOB and dizziness over the past week. He was seen by his cardiologist on 5/26, was started on Xarelto and told to come into the ED. He has had to come into the ED for electrical cardioversion in the past because chemical cardioversion was not effective. Prior to 5/26 the only medication he takes is 81mg of aspirin. He denies any chest pain, diaphoresis, n/v, recent fevers or chills. Over the past week his HR has gone up to the 130s. Currently in the ED, he feels comfortable and his HR is in the 90s.

## 2023-05-28 NOTE — CONSULT NOTE ADULT - ASSESSMENT
57yo man with history of asthma, Afib s/p DCCV in 2018 now off anticoagulation who presents with recurrent Afib with periods of symptomatic RVR at home. Resumed on anticoagulation with rivaroxaban. Currently relatively rate controlled in the ED.      #Afib with RVR  - Start  59yo man with history of asthma, Afib s/p DCCV in 2018 now off anticoagulation who presents with recurrent Afib with periods of symptomatic RVR at home. Resumed on anticoagulation with rivaroxaban. Currently relatively rate controlled in the ED.      #Afib with RVR  - Start metoprolol succinate 25 mg QD  - Continue rivaroxaban 20 mg daily  - Patient should be advised to avoid sports (eg, golf) at this time   - Can be discharged with outpatient cardiology/EP follow up. Outpatient OWEN/DCCV can be arranged.     Discussed with EP attending on call    Kody Harris MD  Department of Cardiology  Cardiology Fellow, PGY5 57yo man with history of asthma, Afib s/p DCCV in 2018 now off anticoagulation who presents with recurrent Afib with periods of symptomatic RVR at home. Resumed on anticoagulation with rivaroxaban. Currently relatively rate controlled in the ED.      #Afib with RVR  - Start metoprolol succinate 25 mg QD  - Continue rivaroxaban 20 mg daily  - Patient should be advised to avoid sports (eg, golf) at this time and call cardiologist or represent to ED if further palpitations/lightheadedness.   - Can be discharged with outpatient cardiology/EP follow up. Outpatient OWEN/DCCV can be arranged.     Discussed with EP attending on call    Kody Harris MD  Department of Cardiology  Cardiology Fellow, PGY5

## 2023-05-28 NOTE — CONSULT NOTE ADULT - ATTENDING COMMENTS
57yo man with history of asthma, Afib s/p DCCV in 2018 now off anticoagulation who presents with recurrent Afib with periods of symptomatic RVR at home. Resumed on anticoagulation with rivaroxaban. Currently relatively rate controlled in the ED.      #Afib with RVR  - Metoprolol succinate 25 mg QD  - Continue rivaroxaban 20 mg daily  - Patient does not want to stay until Tuesday for OWEN/DCCV.   - outpatient follow up.

## 2023-05-28 NOTE — ED PROVIDER NOTE - PROGRESS NOTE DETAILS
Updated Pt about labs and plan to DC home on Metoprolol and statin and follow up with Cardiology for OWEN and EP follow up. discussed importance of avoiding sporting activities including golf. Pt understands return precautions.

## 2023-05-28 NOTE — ED PROVIDER NOTE - ATTENDING CONTRIBUTION TO CARE
I, Winter Roberto, performed a history and physical exam of the patient and discussed their management with the resident and /or advanced care provider. I reviewed the resident and /or ACP's note and agree with the documented findings and plan of care. I was present and available for all procedures.     58-year-old male with history of proximal A-fib in the past presenting to the ED with plans for cardioversion and assessment for ablation.  Patient reports approximately 1 week ago he accidentally ate cookies that had approximately 200 mg with her THC in them.  States he went to sleep and woke up approximately 18 hours later with palpitations and was found to be in A-fib.  Notes that he has some exertional shortness of breath and lightheadedness upon exertion and changing positions.  Was seen by his cardiologist 5/26 started on Xarelto and was referred to the ED today for admission for plan for cardioversion and an assessment for possible ablation.  In the past she had chemical cardioversion that was not effective.  Prior to 5/26 medications patient was aspirin.  Patient has any chest pain diaphoresis nausea vomiting fevers chills.  Was in his normal state of health.  States over the past week his heart rate has gone up to as high as 130s.  Currently patient in the 80s to 90s atrial fibrillation resting comfortably with no acute complaints.    Well-appearing in no acute distress resting comfortably on stretcher.  irregularly irregular rhythm, lungs clear to auscultation bilaterally speaking in full sentences without increased work of breathing.  Saturating well on room air.  Abdomen soft nontender no rebound or guarding.  No CVA tenderness.  No lower extremity edema.  Patient currently rate controlled in the 80s to 90s with no medications.  Will obtain preop labs discussed with EP with plan for admission for cardioversion and possible ablation.

## 2023-05-28 NOTE — ED PROVIDER NOTE - NSFOLLOWUPINSTRUCTIONS_ED_ALL_ED_FT
Please take metoprolol succinate 25 mg once a day, Continue your rivaroxaban 20 mg once a day.   DO NOT partake in any sports such as Golf until you can follow up with your Cardiologist.     Palpitations    A palpitation is the feeling that your heartbeat is irregular or is faster than normal. It may feel like your heart is fluttering or skipping a beat. They may be caused by many things, including smoking, caffeine, alcohol, stress, and certain medicines. Although most causes of palpitations are not serious, palpitations can be a sign of a serious medical problem. Avoid caffeine, alcohol, and tobacco products at home. Try to reduce stress and anxiety and make sure to get plenty of rest.     SEEK IMMEDIATE MEDICAL CARE IF YOU HAVE ANY OF THE FOLLOWING SYMPTOMS: chest pain, shortness of breath, severe headache, dizziness/lightheadedness, or fainting.

## 2023-05-28 NOTE — ED ADULT NURSE NOTE - NSFALLHARMRISKINTERV_ED_ALL_ED

## 2023-05-28 NOTE — ED PROVIDER NOTE - CLINICAL SUMMARY MEDICAL DECISION MAKING FREE TEXT BOX
58 year old male with hx of episodes of AFib in the past sent into the ED by cardiologist Dr. Boone Rodríguez for cardioversion and assessment for ablation. He reports 1 week ago he accidentally ate cookies that had ~200mg worth of THC. He went to sleep and woke up about 18hrs later and felt he was in Afib. He has had palpitations as well as exertional SOB and dizziness over the past week. Pt's ECG is consistent with AFib but is currently rate controlled and Pt's vitals are stable, he is well appearing. No need for chemical or electrical cardioversion needed at this time. Will order preop labs and consult EP cardio for Pt to be eval for inpt vs outpt ablation.

## 2023-05-28 NOTE — CONSULT NOTE ADULT - SUBJECTIVE AND OBJECTIVE BOX
Cardiac Electrophysiology Consult Note    Patient seen and evaluated at bedside    Chief Complaint: Lethargy    Cardiologist: Boone Rodríguez    HPI:  59yo man with history of asthma, Afib s/p DCCV in 2018 now off anticoagulation who recently had recurrence of Afib with episodes of RVR at home prompting patient to present to ED. Per patient his cardiologist recommended he present to the ED for evaluation for cardioversion and/or ablation. Patient reports Sunday he ate THC infused cookies (by accident) and was "out" for about 18 hours and woke up Monday in Afib. He had episodes of HR as high as 140 per his smart watch with palpitations and weakness. He sw his cardiologist 526 who started him on rivaroxaban and told him to come to the ED. Patient reports he came in to be cardioverted, thought it would be one and done and he can leave same day. Prior to starting rivaroxaban 5/26, patient was taking asa 81 mg daily. ECG with Afib and ventricular rate 91 bpm, tele thus far with rate controlled Afib.     PMHx:   Asthma  Afib    PSHx:     Allergies:  No Known Allergies    Home Meds:  Rivaroxaban 20 mg daily    Current Medications:     FAMILY HISTORY:  Noncontributory    Social History:  Denies smoking  Will occasionally take THC gummies for sleep  Works as an     REVIEW OF SYSTEMS:  Constitutional:     [x ] negative [ ] fevers [ ] chills [ ] weight loss [ ] weight gain  HEENT:                  [x ] negative [ ] dry eyes [ ] eye irritation [ ] postnasal drip [ ] nasal congestion  CV:                         [ ] negative  [ ] chest pain [ ] orthopnea [x] palpitations [ ] murmur  Resp:                     [x ] negative [ ] cough [ ] shortness of breath [ ] dyspnea [ ] wheezing [ ] sputum [ ]hemoptysis  GI:                          [ x] negative [ ] nausea [ ] vomiting [ ] diarrhea [ ] constipation [ ] abd pain [ ] dysphagia   :                        [ x] negative [ ] dysuria [ ] nocturia [ ] hematuria [ ] increased urinary frequency  Musculoskeletal: [x ] negative [ ] back pain [ ] myalgias [ ] arthralgias [ ] fracture  Skin:                       [ x] negative [ ] rash [ ] itch  Neurological:        [ x] negative [ ] headache [ ] dizziness [ ] syncope [ ] weakness [ ] numbness  Psychiatric:           [ x] negative [ ] anxiety [ ] depression  Endocrine:            [ x] negative [ ] diabetes [ ] thyroid problem  Heme/Lymph:      [ x] negative [ ] anemia [ ] bleeding problem  Allergic/Immune: [ x] negative [ ] itchy eyes [ ] nasal discharge [ ] hives [ ] angioedema      Physical Exam:  T(F): 97.9 (05-28), Max: 97.9 (05-28)  HR: 98 (05-28) (92 - 98)  BP: 126/90 (05-28) (126/90 - 169/80)  RR: 17 (05-28)  SpO2: 98% (05-28)  GENERAL: No acute distress, well-developed  HEAD:  Atraumatic, Normocephalic  ENT: EOMI, PERRLA, conjunctiva and sclera clear, Neck supple, No JVD, moist mucosa  CHEST/LUNG: Clear to auscultation bilaterally; No wheeze, equal breath sounds bilaterally   BACK: No spinal tenderness  HEART: Irregularly irregular; No murmurs, rubs, or gallops  ABDOMEN: Soft, Nontender, Nondistended; Bowel sounds present  EXTREMITIES:  No clubbing, cyanosis, or edema  PSYCH: Nl behavior, nl affect  NEUROLOGY: AAOx3, non-focal, cranial nerves intact  SKIN: Normal color, No rashes or lesions  LINES:    Cardiovascular Diagnostic Testing:    ECG: Personally reviewed:  As per HPI    CXR: Personally reviewed    Labs: Personally reviewed                        16.3   6.47  )-----------( 205      ( 28 May 2023 08:21 )             48.4     05-28    144  |  104  |  22  ----------------------------<  95  4.1   |  26  |  1.14    Ca    8.8      28 May 2023 08:21  Mg     1.9     05-28    TPro  6.2  /  Alb  3.9  /  TBili  0.5  /  DBili  x   /  AST  21  /  ALT  31  /  AlkPhos  56  05-28    PT/INR - ( 28 May 2023 08:21 )   PT: 21.6 sec;   INR: 1.87 ratio      PTT - ( 28 May 2023 08:21 )  PTT:37.1 sec

## 2023-06-01 DIAGNOSIS — I48.19 OTHER PERSISTENT ATRIAL FIBRILLATION: ICD-10-CM

## 2023-06-21 ENCOUNTER — APPOINTMENT (OUTPATIENT)
Dept: ELECTROPHYSIOLOGY | Facility: CLINIC | Age: 58
End: 2023-06-21
Payer: COMMERCIAL

## 2023-06-21 DIAGNOSIS — G47.33 OBSTRUCTIVE SLEEP APNEA (ADULT) (PEDIATRIC): ICD-10-CM

## 2023-06-21 DIAGNOSIS — I48.0 PAROXYSMAL ATRIAL FIBRILLATION: ICD-10-CM

## 2023-06-21 PROCEDURE — 99202 OFFICE O/P NEW SF 15 MIN: CPT | Mod: 95

## 2023-06-21 NOTE — REASON FOR VISIT
[Home] : at home, [unfilled] , at the time of the visit. [Medical Office: ([facility])___] : at the medical office located in  [Patient] : the patient

## 2023-07-02 PROBLEM — G47.33 OSA (OBSTRUCTIVE SLEEP APNEA): Status: ACTIVE | Noted: 2020-02-03

## 2023-07-02 NOTE — REVIEW OF SYSTEMS
[Feeling Fatigued] : not feeling fatigued [Blurry Vision] : no blurred vision [Sore Throat] : no sore throat [SOB] : no shortness of breath [Dyspnea on exertion] : not dyspnea during exertion [Chest Discomfort] : no chest discomfort [Lower Ext Edema] : no extremity edema [Palpitations] : no palpitations [Cough] : no cough [Abdominal Pain] : no abdominal pain [Hematuria] : no hematuria [Dizziness] : no dizziness [Easy Bleeding] : no tendency for easy bleeding

## 2023-07-02 NOTE — DISCUSSION/SUMMARY
[FreeTextEntry1] : CardioversionPatient is status post recent and the follow-up is doing well and is not have any recurrent A-fib.  Previously when he was in A-fib he was aware of it based on symptoms and sensation in his chest.  We will get a follow-up EKG and monitor and follow the test.\par \par We discussed lifestyle modifications regarding prevention of A-fib.\par \par We discussed the anticoagulation and timeframe to continue for the next 3 months.\par \par We discussed future treatment plans which includes either an antiarrhythmic, pill in the pocket or potentially catheter ablation procedure.\par \par The patient expressed understanding of the plan is for preventative care as well as future treatment should he have recurrent atrial fibrillation.

## 2023-12-29 RX ORDER — RIVAROXABAN 20 MG/1
20 TABLET, FILM COATED ORAL
Qty: 30 | Refills: 1 | Status: ACTIVE | COMMUNITY
Start: 2023-05-26 | End: 1900-01-01

## 2024-01-01 ENCOUNTER — NON-APPOINTMENT (OUTPATIENT)
Age: 59
End: 2024-01-01

## 2024-01-16 LAB
ALBUMIN SERPL ELPH-MCNC: 4.4 G/DL
ALP BLD-CCNC: 56 U/L
ALT SERPL-CCNC: 30 U/L
ANION GAP SERPL CALC-SCNC: 9 MMOL/L
AST SERPL-CCNC: 18 U/L
BILIRUB SERPL-MCNC: 0.6 MG/DL
BUN SERPL-MCNC: 21 MG/DL
CALCIUM SERPL-MCNC: 9.4 MG/DL
CHLORIDE SERPL-SCNC: 104 MMOL/L
CO2 SERPL-SCNC: 28 MMOL/L
CREAT SERPL-MCNC: 1.18 MG/DL
EGFR: 71 ML/MIN/1.73M2
GLUCOSE SERPL-MCNC: 103 MG/DL
POTASSIUM SERPL-SCNC: 4.4 MMOL/L
PROT SERPL-MCNC: 6.4 G/DL
SODIUM SERPL-SCNC: 142 MMOL/L
TSH SERPL-ACNC: 2 UIU/ML

## 2024-05-08 ENCOUNTER — EMERGENCY (EMERGENCY)
Facility: HOSPITAL | Age: 59
LOS: 0 days | Discharge: LEFT BEFORE DISCHARGE | End: 2024-05-08
Attending: EMERGENCY MEDICINE
Payer: COMMERCIAL

## 2024-05-08 ENCOUNTER — NON-APPOINTMENT (OUTPATIENT)
Age: 59
End: 2024-05-08

## 2024-05-08 VITALS
SYSTOLIC BLOOD PRESSURE: 120 MMHG | RESPIRATION RATE: 18 BRPM | HEART RATE: 106 BPM | OXYGEN SATURATION: 100 % | DIASTOLIC BLOOD PRESSURE: 100 MMHG

## 2024-05-08 VITALS — HEIGHT: 73 IN | WEIGHT: 195.11 LBS

## 2024-05-08 DIAGNOSIS — I48.0 PAROXYSMAL ATRIAL FIBRILLATION: ICD-10-CM

## 2024-05-08 DIAGNOSIS — R00.2 PALPITATIONS: ICD-10-CM

## 2024-05-08 DIAGNOSIS — Z53.29 PROCEDURE AND TREATMENT NOT CARRIED OUT BECAUSE OF PATIENT'S DECISION FOR OTHER REASONS: ICD-10-CM

## 2024-05-08 DIAGNOSIS — E03.9 HYPOTHYROIDISM, UNSPECIFIED: ICD-10-CM

## 2024-05-08 DIAGNOSIS — J45.909 UNSPECIFIED ASTHMA, UNCOMPLICATED: ICD-10-CM

## 2024-05-08 LAB
ABO RH CONFIRMATION: SIGNIFICANT CHANGE UP
ADD ON TEST-SPECIMEN IN LAB: SIGNIFICANT CHANGE UP
ALBUMIN SERPL ELPH-MCNC: 3.8 G/DL — SIGNIFICANT CHANGE UP (ref 3.3–5)
ALP SERPL-CCNC: 63 U/L — SIGNIFICANT CHANGE UP (ref 40–120)
ALT FLD-CCNC: 35 U/L — SIGNIFICANT CHANGE UP (ref 12–78)
ANION GAP SERPL CALC-SCNC: 6 MMOL/L — SIGNIFICANT CHANGE UP (ref 5–17)
APTT BLD: 28 SEC — SIGNIFICANT CHANGE UP (ref 24.5–35.6)
AST SERPL-CCNC: 23 U/L — SIGNIFICANT CHANGE UP (ref 15–37)
BASOPHILS # BLD AUTO: 0.06 K/UL — SIGNIFICANT CHANGE UP (ref 0–0.2)
BASOPHILS NFR BLD AUTO: 0.9 % — SIGNIFICANT CHANGE UP (ref 0–2)
BILIRUB SERPL-MCNC: 0.9 MG/DL — SIGNIFICANT CHANGE UP (ref 0.2–1.2)
BLD GP AB SCN SERPL QL: SIGNIFICANT CHANGE UP
BUN SERPL-MCNC: 16 MG/DL — SIGNIFICANT CHANGE UP (ref 7–23)
CALCIUM SERPL-MCNC: 9.6 MG/DL — SIGNIFICANT CHANGE UP (ref 8.5–10.1)
CHLORIDE SERPL-SCNC: 109 MMOL/L — HIGH (ref 96–108)
CO2 SERPL-SCNC: 25 MMOL/L — SIGNIFICANT CHANGE UP (ref 22–31)
CREAT SERPL-MCNC: 1.24 MG/DL — SIGNIFICANT CHANGE UP (ref 0.5–1.3)
EGFR: 67 ML/MIN/1.73M2 — SIGNIFICANT CHANGE UP
EOSINOPHIL # BLD AUTO: 0.1 K/UL — SIGNIFICANT CHANGE UP (ref 0–0.5)
EOSINOPHIL NFR BLD AUTO: 1.5 % — SIGNIFICANT CHANGE UP (ref 0–6)
GLUCOSE SERPL-MCNC: 91 MG/DL — SIGNIFICANT CHANGE UP (ref 70–99)
HCT VFR BLD CALC: 47 % — SIGNIFICANT CHANGE UP (ref 39–50)
HGB BLD-MCNC: 16.2 G/DL — SIGNIFICANT CHANGE UP (ref 13–17)
IMM GRANULOCYTES NFR BLD AUTO: 0.3 % — SIGNIFICANT CHANGE UP (ref 0–0.9)
INR BLD: 1.09 RATIO — SIGNIFICANT CHANGE UP (ref 0.85–1.18)
LYMPHOCYTES # BLD AUTO: 1.64 K/UL — SIGNIFICANT CHANGE UP (ref 1–3.3)
LYMPHOCYTES # BLD AUTO: 24.4 % — SIGNIFICANT CHANGE UP (ref 13–44)
MAGNESIUM SERPL-MCNC: 2 MG/DL — SIGNIFICANT CHANGE UP (ref 1.6–2.6)
MCHC RBC-ENTMCNC: 30.8 PG — SIGNIFICANT CHANGE UP (ref 27–34)
MCHC RBC-ENTMCNC: 34.5 GM/DL — SIGNIFICANT CHANGE UP (ref 32–36)
MCV RBC AUTO: 89.4 FL — SIGNIFICANT CHANGE UP (ref 80–100)
MONOCYTES # BLD AUTO: 0.69 K/UL — SIGNIFICANT CHANGE UP (ref 0–0.9)
MONOCYTES NFR BLD AUTO: 10.3 % — SIGNIFICANT CHANGE UP (ref 2–14)
NEUTROPHILS # BLD AUTO: 4.21 K/UL — SIGNIFICANT CHANGE UP (ref 1.8–7.4)
NEUTROPHILS NFR BLD AUTO: 62.6 % — SIGNIFICANT CHANGE UP (ref 43–77)
NT-PROBNP SERPL-SCNC: 1129 PG/ML — HIGH (ref 0–125)
PHOSPHATE SERPL-MCNC: 3.7 MG/DL — SIGNIFICANT CHANGE UP (ref 2.5–4.5)
PLATELET # BLD AUTO: 213 K/UL — SIGNIFICANT CHANGE UP (ref 150–400)
POTASSIUM SERPL-MCNC: 4 MMOL/L — SIGNIFICANT CHANGE UP (ref 3.5–5.3)
POTASSIUM SERPL-SCNC: 4 MMOL/L — SIGNIFICANT CHANGE UP (ref 3.5–5.3)
PROT SERPL-MCNC: 6.8 GM/DL — SIGNIFICANT CHANGE UP (ref 6–8.3)
PROTHROM AB SERPL-ACNC: 12.3 SEC — SIGNIFICANT CHANGE UP (ref 9.5–13)
RBC # BLD: 5.26 M/UL — SIGNIFICANT CHANGE UP (ref 4.2–5.8)
RBC # FLD: 12.4 % — SIGNIFICANT CHANGE UP (ref 10.3–14.5)
SODIUM SERPL-SCNC: 140 MMOL/L — SIGNIFICANT CHANGE UP (ref 135–145)
TROPONIN I, HIGH SENSITIVITY RESULT: 7.03 NG/L — SIGNIFICANT CHANGE UP
WBC # BLD: 6.72 K/UL — SIGNIFICANT CHANGE UP (ref 3.8–10.5)
WBC # FLD AUTO: 6.72 K/UL — SIGNIFICANT CHANGE UP (ref 3.8–10.5)

## 2024-05-08 PROCEDURE — 84100 ASSAY OF PHOSPHORUS: CPT

## 2024-05-08 PROCEDURE — 84484 ASSAY OF TROPONIN QUANT: CPT

## 2024-05-08 PROCEDURE — 83880 ASSAY OF NATRIURETIC PEPTIDE: CPT

## 2024-05-08 PROCEDURE — 99284 EMERGENCY DEPT VISIT MOD MDM: CPT | Mod: 25

## 2024-05-08 PROCEDURE — 83735 ASSAY OF MAGNESIUM: CPT

## 2024-05-08 PROCEDURE — 80053 COMPREHEN METABOLIC PANEL: CPT

## 2024-05-08 PROCEDURE — 86850 RBC ANTIBODY SCREEN: CPT

## 2024-05-08 PROCEDURE — 93010 ELECTROCARDIOGRAM REPORT: CPT

## 2024-05-08 PROCEDURE — 36000 PLACE NEEDLE IN VEIN: CPT

## 2024-05-08 PROCEDURE — 86901 BLOOD TYPING SEROLOGIC RH(D): CPT

## 2024-05-08 PROCEDURE — 36415 COLL VENOUS BLD VENIPUNCTURE: CPT

## 2024-05-08 PROCEDURE — 86900 BLOOD TYPING SEROLOGIC ABO: CPT

## 2024-05-08 PROCEDURE — 85730 THROMBOPLASTIN TIME PARTIAL: CPT

## 2024-05-08 PROCEDURE — 93005 ELECTROCARDIOGRAM TRACING: CPT

## 2024-05-08 PROCEDURE — 99285 EMERGENCY DEPT VISIT HI MDM: CPT

## 2024-05-08 PROCEDURE — 85025 COMPLETE CBC W/AUTO DIFF WBC: CPT

## 2024-05-08 PROCEDURE — 85610 PROTHROMBIN TIME: CPT

## 2024-05-08 RX ORDER — METOPROLOL TARTRATE 50 MG
1 TABLET ORAL
Qty: 30 | Refills: 0
Start: 2024-05-08

## 2024-05-08 RX ORDER — RIVAROXABAN 15 MG-20MG
1 KIT ORAL
Qty: 30 | Refills: 0
Start: 2024-05-08

## 2024-05-08 RX ORDER — RIVAROXABAN 20 MG/1
20 TABLET, FILM COATED ORAL
Qty: 30 | Refills: 0 | Status: ACTIVE | COMMUNITY
Start: 2024-05-08 | End: 1900-01-01

## 2024-05-08 RX ORDER — DILTIAZEM HCL 120 MG
30 CAPSULE, EXT RELEASE 24 HR ORAL ONCE
Refills: 0 | Status: COMPLETED | OUTPATIENT
Start: 2024-05-08 | End: 2024-05-08

## 2024-05-08 RX ADMIN — Medication 30 MILLIGRAM(S): at 15:09

## 2024-05-08 NOTE — ED STATDOCS - NS_ ATTENDINGSCRIBEDETAILS _ED_A_ED_FT
I, Wilberto Pabon MD, performed the initial face to face bedside interview with this patient regarding history of present illness and determined that the patient should be seen in the main ED.  The history, was documented by the scribe in my presence and I attest to the accuracy of the documentation.

## 2024-05-08 NOTE — ED PROVIDER NOTE - OBJECTIVE STATEMENT
59M sent in by his cardiologist Yesica for rapid Afib. Pt has had multiple cardioversions, is only on ASA 81 and thyroid medication. He woke this morning at 6am and immediately felt palpitations. He spoke to his card who advised him to go to [place] [facility] for cardioversion and he would be reaching out to EP. Pt not on medication for afib. PMD Quique. 59M sent in by his cardiologist Yesica for rapid Afib. Pt has had multiple cardioversions, is only on ASA 81 and thyroid medication. He woke this morning at 6am and immediately felt palpitations. He spoke to his card who advised him to go to [place] ER for cardioversion and he would be reaching out to EP. Pt not on medication for afib. PMD Quique.    SILVANO Walker MD, ED Attjimmy BLACK Note:  Case d/w ED PA.  Pt seen/evaluated in ED.  59-year-old WM, PMH notable for mild asthma, hypothyroid on oral supplement, paroxysmal A-Fib treated in past with temporary AC med and electrical cardioversion not currently on cardiac no AC meds, ambulatory to ED complaining of rapid palpitations noted this morning onset while showering.  Symptoms similar to prior A-Fib, last such episode was 1/2024.  No associated SOB, chest pain, lightheaded, nausea, diaphoresis, F/C, extremity pain/swelling.  Patient called on EPS  (Fazal) who advised patient to go to [place] ED, Dr. KING stated will inform  EPS office for consult. 59M sent in by his cardiologist Yesica for rapid Afib. Pt has had multiple cardioversions, is only on ASA 81 and thyroid medication. He woke this morning at 6am and immediately felt palpitations. He spoke to his card who advised him to go to [place] ER for cardioversion and he would be reaching out to EP. Pt not on medication for afib. PMD Quique.    SILVANO Walker MD, ED Attjimmy BLACK Note:  Case d/w ED PA.  Pt seen/evaluated in ED.  59-year-old WM, PMH notable for mild asthma, hypothyroid on oral supplement, paroxysmal A-Fib treated in past with temporary AC med and electrical cardioversion not currently on cardiac nor AC meds, ambulatory to ED complaining of rapid palpitations noted this morning onset while showering.  Symptoms similar to prior A-Fib, last such episode was 1/2024.  No associated SOB, chest pain, lightheaded, nausea, diaphoresis, F/C, extremity pain/swelling.  Patient called own EPS  (Fazal) who advised patient to go to [place] ED, Dr. KING stated will inform  EPS office for consult.

## 2024-05-08 NOTE — ED PROVIDER NOTE - PROGRESS NOTE DETAILS
signed Isabelle King PA-C   I spoke to EP NP Juan Luis for consult, she will see pt in ED. signed Isbaelle King PA-C   EP NP Juan Luis saw pt in ED, recommended admission for OWEN to r/o thrombus prior to cardioversion. pt does not wish to stay, only wants cardioversion. Pt to leave AMA Juan Luis will send rxs for troprol and xarelto, pt to return at a later date for cardioversion and f/u with his card. Dr Walker discussed case with pt and AMA form signed in ED by pt, witnessed by VENICE Marion. SILVANO Walker MD:  Pt adamantly states cannot saty in hospital for admission to have cardioversion done tomorrow.  Pt agrees to comply with po Xarelto & Metoprolol as sent by EPS NP.  Pt agrees to return to ED Friday 5/10 for further intervention/ cardioversion.  The pt is clinically sober, A&Ox3, free from distracting injury.  Throughout our interactions in the ED today, the pt has demonstrated concrete thinking/reasoning, has maintained an orderly/reasonable conversation, appears to have intact insight/judgment/reason, a sound mind and therefore in our opinion has capacity now to make decisions.  Given the pt’s presentation, we communicated (in laymen's terms) our concern for   ___PAF with RVR requiring initial anticoagulation, hospital admission, inpt TTE before cardioversion as inpt._______________.    The pt verbalized an understanding of our worries.  We’ve communicated to the patient that the ED evaluation is incomplete & many troublesome conditions haven’t been r/o.  We have discussed the need for further ED w/u so we can get more information about the pt’s condition.  We have discussed the range of possible dx, potential testing & tx options.  Our discussions included the potential outcomes of leaving AMA, including worsening of their condition, becoming permanently disabled/in pain/critically ill, or death.  Despite these efforts, we were unable to convince the pt to stay.  The pt is refusing any further care and is leaving against medical advice. We have attempted to offer tx/rx/guidance for any dangerous conditions which are most likely and/or dangerous.  We have answered all questions and have implored the pt to return ASAP to complete the w/u. SILVANO Walker MD:  Labs not actionable (Troponin normal, BNP 1100 with no hypoxia, clear lungs, pt refused CXR).  Pt adamantly states cannot stay in hospital for admission to have cardioversion done tomorrow.  Pt agrees to comply with po Xarelto & Metoprolol to be e-scripted by EPS NP.  Pt agrees to return to ED Friday 5/10 for further intervention/ cardioversion.  The pt is clinically sober, A&Ox3, free from distracting injury.  Throughout our interactions in the ED today, the pt has demonstrated concrete thinking/reasoning, has maintained an orderly/reasonable conversation, appears to have intact insight/judgment/reason, a sound mind and therefore in our opinion has capacity now to make decisions.  Given the pt’s presentation, we communicated (in laymen's terms) our concern for   ___PAF with RVR requiring initial anticoagulation, hospital admission, inpt OWEN before cardioversion as inpt._______________.    The pt verbalized an understanding of our worries.  We’ve communicated to the patient that the ED evaluation is incomplete & many troublesome conditions haven’t been r/o.  We have discussed the need for further ED w/u so we can get more information about the pt’s condition.  We have discussed the range of possible dx, potential testing & tx options.  Our discussions included the potential outcomes of leaving AMA, including worsening of their condition, becoming permanently disabled/in pain/critically ill, or death.  Despite these efforts, we were unable to convince the pt to stay.  The pt is refusing any further care and is leaving against medical advice. We have attempted to offer tx/rx/guidance for any dangerous conditions which are most likely and/or dangerous.  We have answered all questions and have implored the pt to return ASAP to complete the w/u.

## 2024-05-08 NOTE — ED ADULT TRIAGE NOTE - CHIEF COMPLAINT QUOTE
Pt presents to ER sent in by cardiologist r/o palpitations/afib. Pt reports he woke up this morning with palpitations and called his cardiologist who recommended pt to go to ER. Denies CP/SOB

## 2024-05-08 NOTE — ED STATDOCS - PROGRESS NOTE DETAILS
Prema Helton for attending Dr. Pabon   60 yo male w/PMHx asthma presents to the ED c/o palpitations. Pt sent by cardiologist to see EP for possible cardioversion, pt in rapid afib to the 140s in ED. Pt will be sent to Main ED for further work up and evaluation.

## 2024-05-08 NOTE — ED PROVIDER NOTE - NSFOLLOWUPINSTRUCTIONS_ED_ALL_ED_FT
FOLLOW UP WITH YOUR CARDIOLOGIST TOMORROW. CALL THE OFFICE TO MAKE AN APPOINTMENT. RETURN TO ER FOR ANY WORSENING SYMPTOMS OR NEW CONCERNS. YOU ARE LEAVING THE HOSPITAL AGAINST MEDICAL ADVICE. YOU MAY RETURN AT ANY TIME FOR RE-EVALUATION AND ADMISSION.     A-fib (Atrial Fibrillation)    WHAT YOU NEED TO KNOW:    What is atrial fibrillation (A-fib)? A-fib is an irregular heartbeat that reduces your heart's ability to pump blood through your body. A-fib may come and go, or it may be a long-term condition. A-fib can cause life-threatening blood clots, stroke, or heart failure. It is important to treat and manage A-fib to help prevent these problems.  Heart Chambers    What increases my risk for A-fib?    High blood pressure, heart failure, or heart valve disease    Smoking    COPD, sleep apnea, a blood clot in your lung, or other lung diseases    Diabetes, obesity, or thyroid disease    Heavy alcohol use or large amounts of caffeine    Age 65 years or older    A family history of A-fib or other heart problems    Certain medicines, such as some antidepressants, bronchodilators, and cancer medicines  What are the signs and symptoms of A-fib?    A heartbeat that races, pounds, or flutters    Weakness, severe tiredness, or confusion    Feeling lightheaded, sweaty, dizzy, or faint    Shortness of breath or anxiety    Chest pain or pressure  How is A-fib diagnosed? Your healthcare provider will examine you. Tell the provider about your symptoms, health conditions, and medicines. Tell the provider if you drink alcohol, smoke cigarettes, or use any illegal drugs. You will need an EKG to check your heart rhythm and how fast your heart beats. You may also need to wear a Holter monitor at home while you do your usual activities. The Holter monitor is a portable EKG machine.  Holter Monitor    How is A-fib treated? Conditions that cause A-fib, such as thyroid disease, will be treated. You may also need any of the following:    Heart medicines help control your heart rate or rhythm. You may need more than 1 medicine to treat your symptoms.    Antiplatelet or blood thinner medicines help prevent blood clots and stroke.    Cardioversion is a procedure to return your heart rate and rhythm to normal. It can be done using medicines or electric shock.    A-fib ablation is a procedure that uses energy to burn a small area of heart tissue. This creates scar tissue and prevents electrical signals that cause A-fib. You may need this procedure more than 1 time. Ask for more information on A-fib ablation.    A pacemaker may be inserted into your heart. A pacemaker is a device that controls your heartbeat. A pacemaker may be inserted during an ablation procedure or surgery. Ask your healthcare provider for more information on pacemakers.  Pacemaker      Surgery may be needed if other procedures do not work. During surgery your healthcare provider will make cuts in the upper part of your heart. The provider will stitch the cuts together to create scar tissue. The scar tissue will prevent electrical signals that cause A-fib.  How can I manage or prevent A-fib?    Get screening for A-fib, if recommended. Screening means you are checked for A-fib, even if you do not have signs or symptoms. Screening can find problems early so treatment can begin. Early treatment can save your life. Your healthcare provider will talk to you about the benefits and risks of screening. Screening may be recommended starting at age 50. Your provider may recommend regular screenings if you stay at high risk for A-fib.    Know your target heart rate. Learn how to check your pulse and monitor your heart rate.  How to Take a Pulse      Know the risks if you choose to drink alcohol. Alcohol can increase your risk for A-fib or make A-fib harder to manage. Ask your provider if it is okay for you to drink any alcohol. Your provider can help you set limits for the number of drinks you have in 24 hours and in a week. A drink of alcohol is 12 ounces of beer, 5 ounces of wine, or 1½ ounces of liquor.    Do not smoke. Nicotine can cause heart damage and make it more difficult to manage your A-fib. Do not use e-cigarettes or smokeless tobacco in place of cigarettes or to help you quit. They still contain nicotine. Ask your provider for information if you currently smoke and need help quitting.    Eat heart-healthy foods. Heart healthy foods will help keep your cholesterol low. These include fruits, vegetables, whole-grain breads, low-fat dairy products, beans, lean meats, and fish. Replace butter and margarine with heart-healthy oils such as olive oil and canola oil.        Maintain a healthy weight. Ask your provider what a healthy weight is for you. Your provider can help you create a safe weight loss plan, if needed. Even a small goal of a 10% weight loss can improve your heart health.    Get regular physical activity. Physical activity helps improve your heart health. Get at least 150 minutes of moderate aerobic physical activity each week. Your provider can help you create an activity plan.   FAMILY WALKING FOR EXERCISE      Manage other health conditions. This includes high blood pressure or cholesterol, sleep apnea, diabetes, and other heart conditions. Take medicine as directed and follow your treatment plan. Your provider may need to change a medicine you are taking if it is causing your A-fib. Do not stop taking any medicine unless directed by your provider.  Call your local emergency number (911 in the [place]) or have someone call if:    You have any of the following signs of a heart attack:  Squeezing, pressure, or pain in your chest    You may also have any of the following:  Discomfort or pain in your back, neck, jaw, stomach, or arm    Shortness of breath    Nausea or vomiting    Lightheadedness or a sudden cold sweat    You have any of the following signs of a stroke:  Numbness or drooping on one side of your face    Weakness in an arm or leg    Confusion or difficulty speaking    Dizziness, a severe headache, or vision loss  When should I seek immediate care?    Your arm or leg feels warm, tender, and painful. It may look swollen and red.    Your heart rate is more than 110 beats per minute.    You are short of breath, even at rest.  When should I call my doctor?    You have new or worsening swelling in your legs, feet, ankles, or abdomen.    You have questions or concerns about your condition or care.  CARE AGREEMENT:    You have the right to help plan your care. Learn about your health condition and how it may be treated. Discuss treatment options with your healthcare providers to decide what care you want to receive. You always have the right to refuse treatment.    © [employer] 1973, 2024 FOLLOW UP WITH YOUR CARDIOLOGIST TOMORROW. CALL THE OFFICE TO MAKE AN APPOINTMENT. RETURN TO ER FOR ANY WORSENING SYMPTOMS OR NEW CONCERNS. YOU ARE LEAVING THE HOSPITAL AGAINST MEDICAL ADVICE. YOU MAY RETURN AT ANY TIME FOR RE-EVALUATION AND ADMISSION.     Take Metoprolol & Xarelto as prescribed.  You are urged to return Friday, 5/10 for further EPS Cardiology intervention.    A-fib (Atrial Fibrillation)    WHAT YOU NEED TO KNOW:    What is atrial fibrillation (A-fib)? A-fib is an irregular heartbeat that reduces your heart's ability to pump blood through your body. A-fib may come and go, or it may be a long-term condition. A-fib can cause life-threatening blood clots, stroke, or heart failure. It is important to treat and manage A-fib to help prevent these problems.  Heart Chambers    What increases my risk for A-fib?    High blood pressure, heart failure, or heart valve disease    Smoking    COPD, sleep apnea, a blood clot in your lung, or other lung diseases    Diabetes, obesity, or thyroid disease    Heavy alcohol use or large amounts of caffeine    Age 65 years or older    A family history of A-fib or other heart problems    Certain medicines, such as some antidepressants, bronchodilators, and cancer medicines  What are the signs and symptoms of A-fib?    A heartbeat that races, pounds, or flutters    Weakness, severe tiredness, or confusion    Feeling lightheaded, sweaty, dizzy, or faint    Shortness of breath or anxiety    Chest pain or pressure  How is A-fib diagnosed? Your healthcare provider will examine you. Tell the provider about your symptoms, health conditions, and medicines. Tell the provider if you drink alcohol, smoke cigarettes, or use any illegal drugs. You will need an EKG to check your heart rhythm and how fast your heart beats. You may also need to wear a Holter monitor at home while you do your usual activities. The Holter monitor is a portable EKG machine.  Holter Monitor    How is A-fib treated? Conditions that cause A-fib, such as thyroid disease, will be treated. You may also need any of the following:    Heart medicines help control your heart rate or rhythm. You may need more than 1 medicine to treat your symptoms.    Antiplatelet or blood thinner medicines help prevent blood clots and stroke.    Cardioversion is a procedure to return your heart rate and rhythm to normal. It can be done using medicines or electric shock.    A-fib ablation is a procedure that uses energy to burn a small area of heart tissue. This creates scar tissue and prevents electrical signals that cause A-fib. You may need this procedure more than 1 time. Ask for more information on A-fib ablation.    A pacemaker may be inserted into your heart. A pacemaker is a device that controls your heartbeat. A pacemaker may be inserted during an ablation procedure or surgery. Ask your healthcare provider for more information on pacemakers.  Pacemaker      Surgery may be needed if other procedures do not work. During surgery your healthcare provider will make cuts in the upper part of your heart. The provider will stitch the cuts together to create scar tissue. The scar tissue will prevent electrical signals that cause A-fib.  How can I manage or prevent A-fib?    Get screening for A-fib, if recommended. Screening means you are checked for A-fib, even if you do not have signs or symptoms. Screening can find problems early so treatment can begin. Early treatment can save your life. Your healthcare provider will talk to you about the benefits and risks of screening. Screening may be recommended starting at age 50. Your provider may recommend regular screenings if you stay at high risk for A-fib.    Know your target heart rate. Learn how to check your pulse and monitor your heart rate.  How to Take a Pulse      Know the risks if you choose to drink alcohol. Alcohol can increase your risk for A-fib or make A-fib harder to manage. Ask your provider if it is okay for you to drink any alcohol. Your provider can help you set limits for the number of drinks you have in 24 hours and in a week. A drink of alcohol is 12 ounces of beer, 5 ounces of wine, or 1½ ounces of liquor.    Do not smoke. Nicotine can cause heart damage and make it more difficult to manage your A-fib. Do not use e-cigarettes or smokeless tobacco in place of cigarettes or to help you quit. They still contain nicotine. Ask your provider for information if you currently smoke and need help quitting.    Eat heart-healthy foods. Heart healthy foods will help keep your cholesterol low. These include fruits, vegetables, whole-grain breads, low-fat dairy products, beans, lean meats, and fish. Replace butter and margarine with heart-healthy oils such as olive oil and canola oil.        Maintain a healthy weight. Ask your provider what a healthy weight is for you. Your provider can help you create a safe weight loss plan, if needed. Even a small goal of a 10% weight loss can improve your heart health.    Get regular physical activity. Physical activity helps improve your heart health. Get at least 150 minutes of moderate aerobic physical activity each week. Your provider can help you create an activity plan.   FAMILY WALKING FOR EXERCISE      Manage other health conditions. This includes high blood pressure or cholesterol, sleep apnea, diabetes, and other heart conditions. Take medicine as directed and follow your treatment plan. Your provider may need to change a medicine you are taking if it is causing your A-fib. Do not stop taking any medicine unless directed by your provider.  Call your local emergency number (911 in the [place]) or have someone call if:    You have any of the following signs of a heart attack:  Squeezing, pressure, or pain in your chest    You may also have any of the following:  Discomfort or pain in your back, neck, jaw, stomach, or arm    Shortness of breath    Nausea or vomiting    Lightheadedness or a sudden cold sweat    You have any of the following signs of a stroke:  Numbness or drooping on one side of your face    Weakness in an arm or leg    Confusion or difficulty speaking    Dizziness, a severe headache, or vision loss  When should I seek immediate care?    Your arm or leg feels warm, tender, and painful. It may look swollen and red.    Your heart rate is more than 110 beats per minute.    You are short of breath, even at rest.  When should I call my doctor?    You have new or worsening swelling in your legs, feet, ankles, or abdomen.    You have questions or concerns about your condition or care.  CARE AGREEMENT:    You have the right to help plan your care. Learn about your health condition and how it may be treated. Discuss treatment options with your healthcare providers to decide what care you want to receive. You always have the right to refuse treatment.    © [employer] 1973, 2024

## 2024-05-08 NOTE — ED PROVIDER NOTE - CPE EDP RESP NORM
GI Nurses-    Please contact prescribing MD for specific Warfarin recommendations including number of days to be held prior to procedure indicated if appropriate per Kayleigh Liner. Patient is now scheduled for 05/26/2022. Thank you! normal...

## 2024-05-08 NOTE — ED ADULT NURSE NOTE - NSFALLUNIVINTERV_ED_ALL_ED
Bed/Stretcher in lowest position, wheels locked, appropriate side rails in place/Call bell, personal items and telephone in reach/Instruct patient to call for assistance before getting out of bed/chair/stretcher/Non-slip footwear applied when patient is off stretcher/[place] to call system/Physically safe environment - no spills, clutter or unnecessary equipment/Purposeful proactive rounding/Room/bathroom lighting operational, light cord in reach

## 2024-05-08 NOTE — ED PROVIDER NOTE - STUDIES
EKG - see results section for interpretation EKG - see results section for interpretation/Rhythm Strip

## 2024-05-08 NOTE — ED ADULT NURSE NOTE - BREATHING, MLM
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 The surgical margins are positive for tumor cells.

## 2024-05-08 NOTE — ED PROVIDER NOTE - CLINICAL SUMMARY MEDICAL DECISION MAKING FREE TEXT BOX
59-year-old WM, PMH notable for mild asthma, hypothyroid on oral supplement, paroxysmal A-Fib treated in past with temporary AC med and electrical cardioversion not currently on cardiac no AC meds, ambulatory to ED complaining of rapid palpitations noted this morning onset while showering.   ED: initial rapid AF upon arrival, self-improving currently mid-80s -low 100s.  Plan: EKG, CXR, cardiac labs incl. coags/Troponin/BNP.  EPS consult.  Monitor, observe, reassess.    15:30, C MD Denise:  labs unremarkable, Troponin still p[ending.  EP aware of ED consult, advises Tele admit. 59-year-old WM, PMH notable for mild asthma, hypothyroid on oral supplement, paroxysmal A-Fib treated in past with temporary AC med and electrical cardioversion not currently on cardiac no AC meds, ambulatory to ED complaining of rapid palpitations noted this morning onset while showering.   ED: initial rapid AF upon arrival, self-improving currently mid-80s -low 100s.  Plan: EKG, CXR, cardiac labs incl. coags/Troponin/BNP.  EPS consult.  Monitor, observe, reassess.    15:30, C MD Denise:  labs unremarkable, Troponin still p[ending.  EP aware of ED consult, advises Tele admit.    signed Isabelle King PA-C   Pt to leave AMA after speaking to EP NP kathya Kearney does not want OWEN to r/o thrombus. 59-year-old WM, PMH notable for mild asthma, hypothyroid on oral supplement, paroxysmal A-Fib treated in past with temporary AC med and electrical cardioversion not currently on cardiac no AC meds, ambulatory to ED complaining of rapid palpitations noted this morning onset while showering.   ED: initial rapid AF upon arrival, self-improving currently mid-80s -low 100s.  Plan: EKG, CXR, cardiac labs incl. coags/Troponin/BNP.  EPS consult.  Monitor, observe, reassess.    15:30, SILVANO Walker MD:  labs unremarkable, Troponin still pending.  EP aware of ED consult, advises Tele admit.    signed Isabelle King PA-C   Pt to leave AMA after speaking to EP JOSE MIGUEL Kearney, pt does not want OWEN to r/o thrombus.    SILVANO Walker MD:  Troponin normal.  See Progress Notes for case progression, incl. AMA notes.

## 2024-05-08 NOTE — ED PROVIDER NOTE - ATTENDING APP SHARED VISIT CONTRIBUTION OF CARE
EMANI Walker MD, ED Attending physician:  This was a shared visit with FELY.  I reviewed and verified the documentation and independently performed the documented history/exam/mdm.

## 2024-05-08 NOTE — CHART NOTE - NSCHARTNOTEFT_GEN_A_CORE
HPI: 59 year old male with history of PAF who noted he was in Afib this AM and contacted his EP, Dr. Rodríguez, and was advised to come to ED for OWEN/DCCV.  Patient is not on anticoagulation.  Patient seen at bedside, advised to stay for admission for OWEN/DCCV tomorrow but patient declined because he has commitments tomorrow.  Risks of leaving against medical advice reviewed with verbalized understanding  Patient will sign out AMA and will plan to arrange for outpatient OWEN/DCCV and will return to ED for any new or worsening symptoms  Advised to start Xarelto and Toprol daily  Plan discussed with patient, ED team and Dr. Hyman

## 2024-05-08 NOTE — ED ADULT NURSE NOTE - OBJECTIVE STATEMENT
The patient is a 59y Male complaining of palpitations. Pt endorses waking up at approx 6 am when he felt palpitations. Pt has hx of afib and was cardioverted in the past. EKG performed pt placed on cardiac and SPO2 monitor. 20 g IV inserted labs drawn.

## 2024-05-08 NOTE — ED PROVIDER NOTE - PHYSICAL EXAMINATION
SILVANO Walker MD:  Gen'l: Older WM, alert, no respiratory discomfort, NAD  Eyes: PERRL, EOMI  ENT: O/P clear, mmm slt. dry  CV: irregularly irregular, rate between 90 & 110 on monitor, normal radial pulse  Lungs: clear, normal respirations  GI: soft, NT, BS+  MSK: STEEL x 4, no focal extremity swelling nor tenderness  Skin: no rash, no tactile warmth nor diaphoresis  Neuro: A+O x 3, CNs intact, normal speech, no focal motor/sensory deficits

## 2024-05-10 ENCOUNTER — EMERGENCY (EMERGENCY)
Facility: HOSPITAL | Age: 59
LOS: 0 days | Discharge: HOME | End: 2024-05-10
Attending: EMERGENCY MEDICINE
Payer: COMMERCIAL

## 2024-05-10 ENCOUNTER — TRANSCRIPTION ENCOUNTER (OUTPATIENT)
Age: 59
End: 2024-05-10

## 2024-05-10 ENCOUNTER — RESULT REVIEW (OUTPATIENT)
Age: 59
End: 2024-05-10

## 2024-05-10 VITALS
HEART RATE: 73 BPM | RESPIRATION RATE: 18 BRPM | WEIGHT: 220.9 LBS | OXYGEN SATURATION: 100 % | DIASTOLIC BLOOD PRESSURE: 78 MMHG | SYSTOLIC BLOOD PRESSURE: 116 MMHG | TEMPERATURE: 98 F

## 2024-05-10 VITALS — HEART RATE: 71 BPM | RESPIRATION RATE: 17 BRPM | OXYGEN SATURATION: 98 %

## 2024-05-10 DIAGNOSIS — I48.92 UNSPECIFIED ATRIAL FLUTTER: ICD-10-CM

## 2024-05-10 DIAGNOSIS — R00.2 PALPITATIONS: ICD-10-CM

## 2024-05-10 DIAGNOSIS — Z79.01 LONG TERM (CURRENT) USE OF ANTICOAGULANTS: ICD-10-CM

## 2024-05-10 DIAGNOSIS — J45.909 UNSPECIFIED ASTHMA, UNCOMPLICATED: ICD-10-CM

## 2024-05-10 DIAGNOSIS — E03.9 HYPOTHYROIDISM, UNSPECIFIED: ICD-10-CM

## 2024-05-10 DIAGNOSIS — I48.0 PAROXYSMAL ATRIAL FIBRILLATION: ICD-10-CM

## 2024-05-10 PROBLEM — I48.91 UNSPECIFIED ATRIAL FIBRILLATION: Chronic | Status: ACTIVE | Noted: 2024-05-08

## 2024-05-10 LAB
ANION GAP SERPL CALC-SCNC: 7 MMOL/L — SIGNIFICANT CHANGE UP (ref 5–17)
APTT BLD: 37.5 SEC — HIGH (ref 24.5–35.6)
BASOPHILS # BLD AUTO: 0.06 K/UL — SIGNIFICANT CHANGE UP (ref 0–0.2)
BASOPHILS NFR BLD AUTO: 0.8 % — SIGNIFICANT CHANGE UP (ref 0–2)
BLD GP AB SCN SERPL QL: SIGNIFICANT CHANGE UP
BUN SERPL-MCNC: 17 MG/DL — SIGNIFICANT CHANGE UP (ref 7–23)
CALCIUM SERPL-MCNC: 8.8 MG/DL — SIGNIFICANT CHANGE UP (ref 8.5–10.1)
CHLORIDE SERPL-SCNC: 108 MMOL/L — SIGNIFICANT CHANGE UP (ref 96–108)
CO2 SERPL-SCNC: 26 MMOL/L — SIGNIFICANT CHANGE UP (ref 22–31)
CREAT SERPL-MCNC: 1.27 MG/DL — SIGNIFICANT CHANGE UP (ref 0.5–1.3)
EGFR: 65 ML/MIN/1.73M2 — SIGNIFICANT CHANGE UP
EOSINOPHIL # BLD AUTO: 0.16 K/UL — SIGNIFICANT CHANGE UP (ref 0–0.5)
EOSINOPHIL NFR BLD AUTO: 2.2 % — SIGNIFICANT CHANGE UP (ref 0–6)
GLUCOSE SERPL-MCNC: 114 MG/DL — HIGH (ref 70–99)
HCT VFR BLD CALC: 49.8 % — SIGNIFICANT CHANGE UP (ref 39–50)
HGB BLD-MCNC: 17.1 G/DL — HIGH (ref 13–17)
IMM GRANULOCYTES NFR BLD AUTO: 0.1 % — SIGNIFICANT CHANGE UP (ref 0–0.9)
INR BLD: 1.69 RATIO — HIGH (ref 0.85–1.18)
LYMPHOCYTES # BLD AUTO: 1.89 K/UL — SIGNIFICANT CHANGE UP (ref 1–3.3)
LYMPHOCYTES # BLD AUTO: 25.9 % — SIGNIFICANT CHANGE UP (ref 13–44)
MAGNESIUM SERPL-MCNC: 2 MG/DL — SIGNIFICANT CHANGE UP (ref 1.6–2.6)
MCHC RBC-ENTMCNC: 31.1 PG — SIGNIFICANT CHANGE UP (ref 27–34)
MCHC RBC-ENTMCNC: 34.3 GM/DL — SIGNIFICANT CHANGE UP (ref 32–36)
MCV RBC AUTO: 90.5 FL — SIGNIFICANT CHANGE UP (ref 80–100)
MONOCYTES # BLD AUTO: 0.84 K/UL — SIGNIFICANT CHANGE UP (ref 0–0.9)
MONOCYTES NFR BLD AUTO: 11.5 % — SIGNIFICANT CHANGE UP (ref 2–14)
NEUTROPHILS # BLD AUTO: 4.33 K/UL — SIGNIFICANT CHANGE UP (ref 1.8–7.4)
NEUTROPHILS NFR BLD AUTO: 59.5 % — SIGNIFICANT CHANGE UP (ref 43–77)
PLATELET # BLD AUTO: 201 K/UL — SIGNIFICANT CHANGE UP (ref 150–400)
POTASSIUM SERPL-MCNC: 4 MMOL/L — SIGNIFICANT CHANGE UP (ref 3.5–5.3)
POTASSIUM SERPL-SCNC: 4 MMOL/L — SIGNIFICANT CHANGE UP (ref 3.5–5.3)
PROTHROM AB SERPL-ACNC: 18.8 SEC — HIGH (ref 9.5–13)
RBC # BLD: 5.5 M/UL — SIGNIFICANT CHANGE UP (ref 4.2–5.8)
RBC # FLD: 12.3 % — SIGNIFICANT CHANGE UP (ref 10.3–14.5)
SODIUM SERPL-SCNC: 141 MMOL/L — SIGNIFICANT CHANGE UP (ref 135–145)
WBC # BLD: 7.29 K/UL — SIGNIFICANT CHANGE UP (ref 3.8–10.5)
WBC # FLD AUTO: 7.29 K/UL — SIGNIFICANT CHANGE UP (ref 3.8–10.5)

## 2024-05-10 PROCEDURE — 93320 DOPPLER ECHO COMPLETE: CPT | Mod: 26

## 2024-05-10 PROCEDURE — 93320 DOPPLER ECHO COMPLETE: CPT

## 2024-05-10 PROCEDURE — 93010 ELECTROCARDIOGRAM REPORT: CPT | Mod: 76

## 2024-05-10 PROCEDURE — 86900 BLOOD TYPING SEROLOGIC ABO: CPT

## 2024-05-10 PROCEDURE — 99284 EMERGENCY DEPT VISIT MOD MDM: CPT

## 2024-05-10 PROCEDURE — 85025 COMPLETE CBC W/AUTO DIFF WBC: CPT

## 2024-05-10 PROCEDURE — 36415 COLL VENOUS BLD VENIPUNCTURE: CPT

## 2024-05-10 PROCEDURE — 99285 EMERGENCY DEPT VISIT HI MDM: CPT | Mod: 25

## 2024-05-10 PROCEDURE — 93312 ECHO TRANSESOPHAGEAL: CPT

## 2024-05-10 PROCEDURE — 83735 ASSAY OF MAGNESIUM: CPT

## 2024-05-10 PROCEDURE — 93312 ECHO TRANSESOPHAGEAL: CPT | Mod: 26

## 2024-05-10 PROCEDURE — 93325 DOPPLER ECHO COLOR FLOW MAPG: CPT

## 2024-05-10 PROCEDURE — 86901 BLOOD TYPING SEROLOGIC RH(D): CPT

## 2024-05-10 PROCEDURE — 93325 DOPPLER ECHO COLOR FLOW MAPG: CPT | Mod: 26

## 2024-05-10 PROCEDURE — 85730 THROMBOPLASTIN TIME PARTIAL: CPT

## 2024-05-10 PROCEDURE — 80048 BASIC METABOLIC PNL TOTAL CA: CPT

## 2024-05-10 PROCEDURE — G0378: CPT

## 2024-05-10 PROCEDURE — 85610 PROTHROMBIN TIME: CPT

## 2024-05-10 PROCEDURE — 86850 RBC ANTIBODY SCREEN: CPT

## 2024-05-10 PROCEDURE — 93005 ELECTROCARDIOGRAM TRACING: CPT

## 2024-05-10 PROCEDURE — 92960 CARDIOVERSION ELECTRIC EXT: CPT

## 2024-05-10 RX ORDER — LEVOTHYROXINE SODIUM 125 MCG
1 TABLET ORAL
Refills: 0 | DISCHARGE

## 2024-05-10 RX ORDER — METOPROLOL TARTRATE 50 MG
1 TABLET ORAL
Qty: 0 | Refills: 0 | DISCHARGE

## 2024-05-10 RX ORDER — RIVAROXABAN 15 MG-20MG
1 KIT ORAL
Qty: 0 | Refills: 0 | DISCHARGE

## 2024-05-10 RX ORDER — METOPROLOL TARTRATE 50 MG
1 TABLET ORAL
Qty: 30 | Refills: 0
Start: 2024-05-10

## 2024-05-10 NOTE — DISCHARGE NOTE PROVIDER - HOSPITAL COURSE
60 yo M with PMHx of hypothyroidism, Asthma, PAF s/p multiple DCCV, last DCCV was on Jan 2024.  Pt woke up with his usual AF symptoms which is fatigue & palpitations. Pt came in ER on 5/8 discharged on xarelto 20mg daily & toprol 25mg daily. Pt remains in AFib, came in to ER today with symptoms.  Pt has been followed with  was considering AF ablation in the future.  Pt took xarelto & toprol today, has been NPO since MN.  s/p OWEN: no LA/TIN thrombus, followed by successful DCCV to NSR 70's bpm  Post procedure pt remains stable with stable V/S.  d/c home today, to f/u with  for AF ablation.

## 2024-05-10 NOTE — DISCHARGE NOTE PROVIDER - NSDCMRMEDTOKEN_GEN_ALL_CORE_FT
levothyroxine 25 mcg (0.025 mg) oral tablet: 1 tab(s) orally once a day  Toprol-XL 25 mg oral tablet, extended release: 1 tab(s) orally once a day  Xarelto 20 mg oral tablet: 1 tab(s) orally once a day

## 2024-05-10 NOTE — ED PROVIDER NOTE - CLINICAL SUMMARY MEDICAL DECISION MAKING FREE TEXT BOX
59-year-old male with history of asthma, atrial fibrillation status post prior cardioversion,  hypothyroidism presents for planned  cardioversion by Dr. Soto.  Patient was seen in the emergency department 2 days ago for atrial fibrillation with RVR and started on metoprolol.  Patient is taking Xarelto as anticoagulation.  Patient denies any chest pain or shortness of breath.  Will contact electrophysiology.

## 2024-05-10 NOTE — DISCHARGE NOTE PROVIDER - CARE PROVIDER_API CALL
Boone Rodríguez.  Cardiac Electrophysiology  [address] [place] [id number]  Phone: [phone]  Fax: [phone]  Follow Up Time: Routine

## 2024-05-10 NOTE — ED PROVIDER NOTE - PHYSICAL EXAMINATION
CONSTITUTIONAL: Well appearing, awake, alert, oriented to person, place, time/situation and in no apparent distress.  · ENMT: Airway patent, Nasal mucosa clear. Mouth with normal mucosa. Throat has no vesicles, no oropharyngeal exudates and uvula is midline.  · EYES: Clear bilaterally, pupils equal, round and reactive to light.  · CARDIAC: tachycardia,,  irregularly irregular rhythm.  Heart sounds S1, S2.  No murmurs, rubs or gallops.  · RESPIRATORY: Breath sounds clear and equal bilaterally.  · GASTROINTESTINAL: Abdomen soft, non-tender, no guarding.  · MUSCULOSKELETAL: Spine appears normal, range of motion is not limited, no muscle or joint tenderness  · NEUROLOGICAL: Alert and oriented, no focal deficits, no motor or sensory deficits.  · SKIN: Skin normal color for race, warm, dry and intact. No evidence of rash

## 2024-05-10 NOTE — ED ADULT TRIAGE NOTE - CHIEF COMPLAINT QUOTE
complains of palpitations and rapid heart rate. states symptoms since may 8th. was seen in ED, prescribed metoprolol and xarelto, told to return this morning for cardioversion. last took metoprolol just prior to arrival.   cardiologist: dr gold

## 2024-05-10 NOTE — ED ADULT NURSE REASSESSMENT NOTE - NS ED NURSE REASSESS COMMENT FT1
Report received from VENICE Driscoll. Pt is A&Ox3, VSS and documented. Pt denies chest pain, dizziness, SOB but states his orginal symptoms have not resolved. Pt does not appear to be in discomfort or acute distress at this time. Pt updated on plan of care. Cardiac monitor in place. Safety maintained, bed in lowest position, call bell within reach.

## 2024-05-10 NOTE — DISCHARGE NOTE NURSING/CASE MANAGEMENT/SOCIAL WORK - PATIENT PORTAL LINK FT
You can access the FollowMyHealth Patient Portal offered by [facility] by registering at the following website: [email or web address]. By joining [employer]’s FollowMyHealth portal, you will also be able to view your health information using other applications (apps) compatible with our system.

## 2024-05-10 NOTE — H&P CARDIOLOGY - COMMENTS
58 yo M with above PMHx presented with symptomatic PAF, started on xarelto/toprol 2 days ago, remains in AF.  - keep pt NPO for OWEN/DCCV today  - uninterrupted xarelto 20mg daily  - f/u with Dr. Rodríguez for AF ablation  Plan d/w pt//ER provider

## 2024-05-10 NOTE — ED ADULT NURSE REASSESSMENT NOTE - NS ED NURSE REASSESS COMMENT FT1
report given to EP. Pt has no complaints at this time. Pt does not appear in discomfort or distress at this time. Pt updated on plan of care. Safety and comfort maintained.

## 2024-05-10 NOTE — PROCEDURAL SAFETY CHECKLIST WITH OR WITHOUT SEDATION - NSPOSTCOMMENTFT_GEN_ALL_CORE
PATIENT WITH SUCCESSFUL OWEN WITH DCCV. PROBE IN, BUBBLES    , PROBE OUT     , 1X 200J DCCV DELIVERED, SUCCESSFUL CONVERSION TO NSR, CONFIRMED BY EKG. VSS, ROS AS CHARTED, REMAINS AS PREVIOUSLY ASSESSED. PATIENT WITH SUCCESSFUL OWEN WITH DCCV. PROBE IN 0909, BUBBLES  0914  , PROBE OUT 0915, 1X 200J SHOCK DELIVERED 0916, SUCCESSFUL CONVERSION TO NSR, CONFIRMED BY EKG. VSS, ROS AS CHARTED, REMAINS AS PREVIOUSLY ASSESSED. PATIENT AX0X4, IN NO ACUTE DISTRESS.

## 2024-05-10 NOTE — PACU DISCHARGE NOTE - COMMENTS
Patient with uneventful OWEN and successful cardioversion with Dr. Soto. Patient alert and oriented, VSS, ROS as charted, remains as previously assessed. ASU discharge instructions given verbally and in written form by RN, patient verbalized understanding. Patient transported to [facility] in wheelchair with transport aide, all possessions sent home with patient.

## 2024-05-10 NOTE — H&P CARDIOLOGY - EXTREMITIES
Levothyroxine Sodium 88 MCG Oral Tab     Last OV relevant to medication: 4-9-2019     Last refill date: 5-9-2019 # 90 tabs with 0 refills     When pt was asked to return for OV:  6 months for HTN/DM.      Upcoming appt/reason:none     Labs: 5-3-2019 # tsh
No cyanosis, clubbing or edema

## 2024-05-10 NOTE — ED ADULT NURSE NOTE - NSFALLHARMRISKINTERV_ED_ALL_ED

## 2024-05-10 NOTE — H&P CARDIOLOGY - HISTORY OF PRESENT ILLNESS
58 yo M with PMHx of hypothyroidism, Asthma, PAF s/p multiple DCCV, last DCCV was on Jan 2024.  Pt woke up with his usual AF symptoms which is fatigue & palpitations. Pt came in ER on 5/8 discharged on xarelto 20mg daily & toprol 25mg daily. Pt remains in AFib, came in to ER today with symptoms.  Pt has been followed with ath was considering AF ablation in the future.  Pt took xarelto & toprol today, has been NPO since MN.

## 2024-05-10 NOTE — ED ADULT NURSE NOTE - NSFALLCONCLUSION_ED_ALL_ED
Billing Type: Third-Party Bill Fall with Harm Risk Bill For Surgical Tray: no Expected Date Of Service: 07/17/2020

## 2024-05-10 NOTE — ED ADULT NURSE NOTE - OBJECTIVE STATEMENT
Patient is a 60 y/o male who present to the ED with complains of palpitations and rapid heart rate. states symptoms since may 8th. was seen in ED, prescribed metoprolol and xarelto, told to return this morning for cardioversion. last took metoprolol just prior to arrival.   cardiologist: dr gold

## 2024-05-10 NOTE — DISCHARGE NOTE NURSING/CASE MANAGEMENT/SOCIAL WORK - NSDCPEFALRISK_GEN_ALL_CORE
For information on Fall & Injury Prevention, visit: [email or web address] OR  [email or web address] OR  https://www.cdc.gov/steadi/patient.html

## 2024-05-10 NOTE — DISCHARGE NOTE PROVIDER - NSDCCPCAREPLAN_GEN_ALL_CORE_FT
PRINCIPAL DISCHARGE DIAGNOSIS  Diagnosis: Paroxysmal atrial fibrillation  Assessment and Plan of Treatment: -You had OWEN/cardioversion today by   - continue uninterrupted xarelto/toprol as directed  - f/u wiht  to evlauate for AFib ablation

## 2024-05-14 NOTE — POST DISCHARGE NOTE - DETAILS:
Post procedure phone call completed; patient understood all discharge paperwork. No questions regarding medications or pain management. MD follow up appointment made. Patient was able to rest when they were discharged. Patient will recommend [facility], no complaints of hospital stay, satisfied with care. Instructed patient to contact provider with any further questions or concerns.

## 2024-06-12 ENCOUNTER — APPOINTMENT (OUTPATIENT)
Dept: ELECTROPHYSIOLOGY | Facility: CLINIC | Age: 59
End: 2024-06-12

## 2024-06-12 PROCEDURE — 99212 OFFICE O/P EST SF 10 MIN: CPT
